# Patient Record
Sex: FEMALE | Race: WHITE | NOT HISPANIC OR LATINO | Employment: FULL TIME | URBAN - METROPOLITAN AREA
[De-identification: names, ages, dates, MRNs, and addresses within clinical notes are randomized per-mention and may not be internally consistent; named-entity substitution may affect disease eponyms.]

---

## 2017-04-03 ENCOUNTER — APPOINTMENT (OUTPATIENT)
Dept: WOMENS IMAGING | Facility: HOSPITAL | Age: 47
End: 2017-04-03

## 2017-04-03 PROCEDURE — 77067 SCR MAMMO BI INCL CAD: CPT | Performed by: RADIOLOGY

## 2017-09-05 ENCOUNTER — HOSPITAL ENCOUNTER (OUTPATIENT)
Dept: GENERAL RADIOLOGY | Facility: HOSPITAL | Age: 47
Discharge: HOME | End: 2017-09-05
Admitting: NURSE PRACTITIONER

## 2017-09-05 ENCOUNTER — OFFICE VISIT (OUTPATIENT)
Dept: INTERNAL MEDICINE | Age: 47
End: 2017-09-05

## 2017-09-05 VITALS
TEMPERATURE: 97.3 F | HEIGHT: 64 IN | DIASTOLIC BLOOD PRESSURE: 88 MMHG | HEART RATE: 77 BPM | BODY MASS INDEX: 18.06 KG/M2 | SYSTOLIC BLOOD PRESSURE: 128 MMHG | OXYGEN SATURATION: 98 % | WEIGHT: 105.8 LBS

## 2017-09-05 DIAGNOSIS — Z76.89 ENCOUNTER TO ESTABLISH CARE: ICD-10-CM

## 2017-09-05 DIAGNOSIS — M25.562 LEFT MEDIAL KNEE PAIN: Primary | ICD-10-CM

## 2017-09-05 DIAGNOSIS — Z00.00 ROUTINE ADULT HEALTH MAINTENANCE: ICD-10-CM

## 2017-09-05 DIAGNOSIS — J30.2 SEASONAL ALLERGIC RHINITIS, UNSPECIFIED ALLERGIC RHINITIS TRIGGER: ICD-10-CM

## 2017-09-05 PROCEDURE — 99213 OFFICE O/P EST LOW 20 MIN: CPT | Performed by: NURSE PRACTITIONER

## 2017-09-05 PROCEDURE — 73562 X-RAY EXAM OF KNEE 3: CPT

## 2017-09-05 RX ORDER — FLUTICASONE PROPIONATE 50 MCG
SPRAY, SUSPENSION (ML) NASAL
COMMUNITY
Start: 2017-08-24

## 2017-09-05 RX ORDER — MELOXICAM 7.5 MG/1
7.5 TABLET ORAL DAILY
Qty: 30 TABLET | Refills: 1 | Status: SHIPPED | OUTPATIENT
Start: 2017-09-05 | End: 2019-10-23

## 2017-09-05 RX ORDER — CHLORAL HYDRATE 500 MG
CAPSULE ORAL
COMMUNITY
End: 2021-11-04

## 2017-09-05 RX ORDER — MONTELUKAST SODIUM 10 MG/1
TABLET ORAL
COMMUNITY
Start: 2017-08-24 | End: 2018-08-20

## 2017-09-05 RX ORDER — MELATONIN
1000 DAILY
COMMUNITY

## 2017-09-05 RX ORDER — ASCORBIC ACID 500 MG
500 TABLET ORAL DAILY
COMMUNITY

## 2017-09-05 NOTE — PATIENT INSTRUCTIONS
Meniscus Tear  A meniscus tear is a knee injury in which a piece of the meniscus is torn. The meniscus is a thick, rubbery, wedge-shaped cartilage in the knee. Two menisci are located in each knee. They sit between the upper bone (femur) and lower bone (tibia) that make up the knee joint. Each meniscus acts as a shock absorber for the knee.  A torn meniscus is one of the most common types of knee injuries. This injury can range from mild to severe. Surgery may be needed for a severe tear.  CAUSES  This injury may be caused by any squatting, twisting, or pivoting movement. Sports-related injuries are the most common cause. These often occur from:  · Running and stopping suddenly.  · Changing direction.  · Being tackled or knocked off your feet.  As people get older, their meniscus gets thinner and weaker. In these people, tears can happen more easily, such as from climbing stairs.   RISK FACTORS  This injury is more likely to happen to:  · People who play contact sports.  · Males.  · People who are 30-40 years of age.  SYMPTOMS   Symptoms of this injury include:  · Knee pain, especially at the side of the knee joint. You may feel pain when the injury occurs, or you may only hear a pop and feel pain later.  · A feeling that your knee is clicking, catching, locking, or giving way.  · Not being able to fully bend or extend your knee.  · Bruising or swelling in your knee.  DIAGNOSIS   This injury may be diagnosed based on your symptoms and a physical exam. The physical exam may include:  · Moving your knee in different ways.  · Feeling for tenderness.  · Listening for a clicking sound.  · Checking if your knee locks or catches.  You may also have tests, such as:  · X-rays.  · MRI.  · A procedure to look inside your knee with a narrow surgical telescope (arthroscopy).  You may be referred to a knee specialist (orthopedic surgeon).  TREATMENT   Treatment for this injury depends on the severity of the tear. Treatment for a  mild tear may include:  · Rest.  · Medicine to reduce pain and swelling. This is usually a nonsteroidal anti-inflammatory drug (NSAID).  · A knee brace or an elastic sleeve or wrap.  · Using crutches or a walker to keep weight off your knee and to help you walk.  · Exercises to strengthen your knee (physical therapy).  You may need surgery if you have a severe tear or if other treatments are not working.   HOME CARE INSTRUCTIONS  Managing Pain and Swelling  · Take over-the-counter and prescription medicines only as told by your health care provider.  · If directed, apply ice to the injured area:  ¨ Put ice in a plastic bag.  ¨ Place a towel between your skin and the bag.  ¨ Leave the ice on for 20 minutes, 2-3 times per day.  · Raise (elevate) the injured area above the level of your heart while you are sitting or lying down.  Activity  · Do not use the injured limb to support your body weight until your health care provider says that you can. Use crutches or a walker as told by your health care provider.  · Return to your normal activities as told by your health care provider. Ask your health care provider what activities are safe for you.  · Perform range-of-motion exercises only as told by your health care provider.  · Begin doing exercises to strengthen your knee and leg muscles only as told by your health care provider. After you recover, your health care provider may recommend these exercises to help prevent another injury.  General Instructions  · Use a knee brace or elastic wrap as told by your health care provider.  · Keep all follow-up visits as told by your health care provider. This is important.  SEEK MEDICAL CARE IF:  · You have a fever.  · Your knee becomes red, tender, or swollen.  · Your pain medicine is not helping.  · Your symptoms get worse or do not improve after 2 weeks of home care.     This information is not intended to replace advice given to you by your health care provider. Make sure you  discuss any questions you have with your health care provider.     Document Released: 03/09/2004 Document Revised: 09/07/2016 Document Reviewed: 04/11/2016  Elsevier Interactive Patient Education ©2017 [employer]

## 2017-09-05 NOTE — PROGRESS NOTES
"Chante Olivas / 47 y.o. / female  09/05/2017    VITALS:    /88  Pulse 77  Temp 97.3 °F (36.3 °C)  Ht 63.5\" (161.3 cm)  Wt 105 lb 12.8 oz (48 kg)  SpO2 98%  BMI 18.45 kg/m2  BP Readings from Last 3 Encounters:   09/05/17 128/88     Wt Readings from Last 3 Encounters:   09/05/17 105 lb 12.8 oz (48 kg)      Body mass index is 18.45 kg/(m^2).    CC: Main reason(s) for today's visit: Establish Care (new pt to establish care)    Knee Pain: Patient presents with knee pain involving the  left knee. Onset of the symptoms was several weeks ago. Inciting event: none known. Current symptoms include pain located medial knee. Pain is aggravated by going up and down stairs and rising after sitting.  Patient has had no prior knee problems. Evaluation to date: none. Treatment to date: OTC analgesics which are somewhat effective. Also using ice, without improvement. No longer swelling.   Increased walking activity, wears heels frequently, no running.   Pain started while walking up steps, although patient cannot recall any specific injury or twisting of the knee.  No previous history of knee issues.  ____________________________________________________________________    ASSESSMENT & PLAN:    1. Left medial knee pain  Consider minor meniscal injury of left knee.  Discussed with patient we will obtain knee x-ray today to rule out any bony abnormality.  Discussed conservative treatment with use of daily by mouth NSAID, rest as needed, will follow-up with me in about 1 month for reevaluation.  If knee pain persists or worsens, may need to consider MRI and/or orthopedic referral.    - XR Knee 3 View Left  - meloxicam (MOBIC) 7.5 MG tablet; Take 1 tablet by mouth Daily.  Dispense: 30 tablet; Refill: 1    2. Encounter to establish care    - CBC & Differential  - Comprehensive Metabolic Panel  - Lipid Panel With / Chol / HDL Ratio  - TSH+Free T4  - Vitamin D 25 Hydroxy    3. Routine adult health maintenance    - CBC & " Differential  - Comprehensive Metabolic Panel  - Lipid Panel With / Chol / HDL Ratio  - TSH+Free T4  - Vitamin D 25 Hydroxy    4. Seasonal allergic rhinitis, unspecified allergic rhinitis trigger    - CBC & Differential  - Comprehensive Metabolic Panel  - Lipid Panel With / Chol / HDL Ratio  - TSH+Free T4  - Vitamin D 25 Hydroxy      Summary/Discussion:            Return in about 1 month (around 10/5/2017).    Future Appointments  Date Time Provider Department Center   9/6/2017 8:50 AM [facility] None   10/5/2017 8:40 AM [facility] Black[facility] None       ____________________________________________________________________    HPI:    Patient is a 47 y.o. female who is here to establish care.         Patient Care Team:  CHENG Blackman as PCP - General (Internal Medicine)  Jasmyne Johnston MD as Consulting Physician (Obstetrics and Gynecology)  Devaughn Obrien MD as Consulting Physician (Urology)    REVIEW OF SYSTEMS    Review of Systems   Constitutional: Negative for chills, fatigue and fever.   Respiratory: Negative for shortness of breath.    Cardiovascular: Negative for chest pain.   Musculoskeletal: Positive for arthralgias. Negative for myalgias.   Neurological: Negative for dizziness, numbness and headaches.   Psychiatric/Behavioral: Positive for sleep disturbance.     Other: As noted per HPI      PHYSICAL EXAMINATION    Physical Exam   Constitutional: She is oriented to person, place, and time. Vital signs are normal. She appears well-developed and well-nourished. She is cooperative. She does not appear ill. No distress.   Cardiovascular: Normal rate, regular rhythm, S1 normal, S2 normal and normal heart sounds.    No murmur heard.  Pulmonary/Chest: Effort normal and breath sounds normal. She has no decreased breath sounds. She has no wheezes. She has no rhonchi. She has no rales.   Musculoskeletal:        Right knee: Normal.        Left knee: She exhibits normal range of  motion, no swelling, no effusion, no ecchymosis, no deformity, no laceration, no erythema, normal alignment, no LCL laxity, normal patellar mobility, no bony tenderness, normal meniscus and no MCL laxity. Tenderness found. Medial joint line (mild) tenderness noted. No MCL, no LCL and no patellar tendon tenderness noted.   Neurological: She is alert and oriented to person, place, and time.   Skin: Skin is warm, dry and intact.   Psychiatric: She has a normal mood and affect. Her speech is normal and behavior is normal. Judgment and thought content normal. Cognition and memory are normal.   Nursing note and vitals reviewed.      REVIEWED DATA:    Labs:   No results found for: NA, K, AST, ALT, BUN, CREATININE, EGFRIFNONA, EGFRIFAFRI    No results found for: GLU, HGBA1C, MICROALBUR    No results found for: LDL, HDL, TRIG, CHOLHDLRATIO    No results found for: TSH, FREET4       Lab Results   Component Value Date    WBC 6.09 11/06/2015    HGB 11.5 (L) 11/06/2015     11/06/2015         Imaging:        Medical Tests:        Summary of old records / correspondence / consultant report:        Request outside records:        ______________________________________________________________________    No Known Allergies    Current Outpatient Prescriptions on File Prior to Visit   Medication Sig Dispense Refill   • cetirizine (zyrTEC) 10 MG tablet Take 10 mg by mouth.     • norethindrone-ethinyl estradiol (ORTHO-NOVUM 1-35 TAB,NORTREL 1-35 TAB) 1-35 MG-MCG per tablet Take 1 tablet by mouth.       No current facility-administered medications on file prior to visit.        PFSH:     The following portions of the patient's history were reviewed and updated as appropriate: Allergies / Current Medications / Past Medical History / Surgical History / Social History / Family History    There is no problem list on file for this patient.      Past Medical History:   Diagnosis Date   • Allergic    • Kidney stone        Past Surgical  History:   Procedure Laterality Date   • PARTIAL HYSTERECTOMY N/A 11/2015   • TONSILLECTOMY         Social History     Social History   • Marital status:      Spouse name: N/A   • Number of children: 0   • Years of education: N/A     Occupational History   •        Social History Main Topics   • Smoking status: Never Smoker   • Smokeless tobacco: Never Used   • Alcohol use Yes      Comment: 1 glasses every night   • Drug use: No   • Sexual activity: Yes     Partners: Male     Other Topics Concern   • None     Social History Narrative   • None       Family History   Problem Relation Age of Onset   • Lung cancer Maternal Grandmother    • Prostate cancer Maternal Grandfather    • Cancer Maternal Grandfather    • Hypertension Mother    • Hypertension Father    • Heart disease Father    • Cancer Paternal Grandmother          **Sunny Disclaimer:   Much of this encounter note is an electronic transcription/translation of spoken language to printed text. The electronic translation of spoken language may permit erroneous, or at times, nonsensical words or phrases to be inadvertently transcribed. Although I have reviewed the note for such errors, some may still exist.

## 2017-09-06 ENCOUNTER — TELEPHONE (OUTPATIENT)
Dept: INTERNAL MEDICINE | Age: 47
End: 2017-09-06

## 2017-09-06 NOTE — TELEPHONE ENCOUNTER
----- Message from CHENG Blackman sent at 9/6/2017  8:36 AM EDT -----  Please call patient with results and send result letter to home address.    Chante:     Your knee Xray shows some very mild arthritis of the knee, also with a small effusion (fluid on the knee). This is usually caused by some inflammation in the knee, either arthritis or from meniscal injury like we discussed. I would continue to treat conservatively as we discussed during your visit, and follow-up in about 1 month.    Lissett ANAND

## 2017-09-06 NOTE — TELEPHONE ENCOUNTER
Left VM for pt regarding X-ray results. Pt was informed of X-ray results, showing mild arthritis and effusion. Pt was advised to continue treatment as planned, and to f/u in 1 month as scheduled. Pt was advised to call office with any questions or concerns.    CATINA

## 2017-09-07 LAB
25(OH)D3+25(OH)D2 SERPL-MCNC: 36.4 NG/ML (ref 30–100)
ALBUMIN SERPL-MCNC: 4.4 G/DL (ref 3.5–5.2)
ALBUMIN/GLOB SERPL: 1.7 G/DL
ALP SERPL-CCNC: 44 U/L (ref 39–117)
ALT SERPL-CCNC: 13 U/L (ref 1–33)
AST SERPL-CCNC: 17 U/L (ref 1–32)
BASOPHILS # BLD AUTO: 0.04 10*3/MM3 (ref 0–0.2)
BASOPHILS NFR BLD AUTO: 0.8 % (ref 0–1.5)
BILIRUB SERPL-MCNC: 0.6 MG/DL (ref 0.1–1.2)
BUN SERPL-MCNC: 15 MG/DL (ref 6–20)
BUN/CREAT SERPL: 20.5 (ref 7–25)
CALCIUM SERPL-MCNC: 9.5 MG/DL (ref 8.6–10.5)
CHLORIDE SERPL-SCNC: 102 MMOL/L (ref 98–107)
CHOLEST SERPL-MCNC: 207 MG/DL (ref 0–200)
CHOLEST/HDLC SERPL: 2.33 {RATIO}
CO2 SERPL-SCNC: 29.3 MMOL/L (ref 22–29)
CREAT SERPL-MCNC: 0.73 MG/DL (ref 0.57–1)
EOSINOPHIL # BLD AUTO: 0.04 10*3/MM3 (ref 0–0.7)
EOSINOPHIL NFR BLD AUTO: 0.8 % (ref 0.3–6.2)
ERYTHROCYTE [DISTWIDTH] IN BLOOD BY AUTOMATED COUNT: 13.3 % (ref 11.7–13)
GLOBULIN SER CALC-MCNC: 2.6 GM/DL
GLUCOSE SERPL-MCNC: 95 MG/DL (ref 65–99)
HCT VFR BLD AUTO: 40.4 % (ref 35.6–45.5)
HDLC SERPL-MCNC: 89 MG/DL (ref 40–60)
HGB BLD-MCNC: 13 G/DL (ref 11.9–15.5)
IMM GRANULOCYTES # BLD: 0 10*3/MM3 (ref 0–0.03)
IMM GRANULOCYTES NFR BLD: 0 % (ref 0–0.5)
LDLC SERPL CALC-MCNC: 104 MG/DL (ref 0–100)
LYMPHOCYTES # BLD AUTO: 2.24 10*3/MM3 (ref 0.9–4.8)
LYMPHOCYTES NFR BLD AUTO: 42.6 % (ref 19.6–45.3)
MCH RBC QN AUTO: 31.5 PG (ref 26.9–32)
MCHC RBC AUTO-ENTMCNC: 32.2 G/DL (ref 32.4–36.3)
MCV RBC AUTO: 97.8 FL (ref 80.5–98.2)
MONOCYTES # BLD AUTO: 0.61 10*3/MM3 (ref 0.2–1.2)
MONOCYTES NFR BLD AUTO: 11.6 % (ref 5–12)
NEUTROPHILS # BLD AUTO: 2.33 10*3/MM3 (ref 1.9–8.1)
NEUTROPHILS NFR BLD AUTO: 44.2 % (ref 42.7–76)
PLATELET # BLD AUTO: 253 10*3/MM3 (ref 140–500)
POTASSIUM SERPL-SCNC: 4.4 MMOL/L (ref 3.5–5.2)
PROT SERPL-MCNC: 7 G/DL (ref 6–8.5)
RBC # BLD AUTO: 4.13 10*6/MM3 (ref 3.9–5.2)
SODIUM SERPL-SCNC: 142 MMOL/L (ref 136–145)
T4 FREE SERPL-MCNC: 1.15 NG/DL (ref 0.93–1.7)
TRIGL SERPL-MCNC: 69 MG/DL (ref 0–150)
TSH SERPL DL<=0.005 MIU/L-ACNC: 2.04 MIU/ML (ref 0.27–4.2)
VLDLC SERPL CALC-MCNC: 13.8 MG/DL (ref 5–40)
WBC # BLD AUTO: 5.26 10*3/MM3 (ref 4.5–10.7)

## 2017-09-08 ENCOUNTER — TELEPHONE (OUTPATIENT)
Dept: INTERNAL MEDICINE | Age: 47
End: 2017-09-08

## 2017-09-08 NOTE — TELEPHONE ENCOUNTER
Left VM for pt regarding lab results. Pt was informed of all normal lab results. Pt was instructed to continue current medications and f/u at next scheduled appt.    KD

## 2017-09-08 NOTE — TELEPHONE ENCOUNTER
"----- Message from CHENG Blackman sent at 9/7/2017 11:30 AM EDT -----  Please call patient with results and send result letter to home address.    Chante:     Here are the results of your labs from your visit.  Your CBC looks normal, no sign of anemia, platelet disorder, or obvious infection.   Your CMP labs are all normal. This is a measure of kidney function, electrolytes, and liver function.     Your cholesterol levels show that your total cholesterol is a little bit elevated at 207, but your HDL, which is considered good cholesterol, is at a very high level of 89, which is good.  Your LDL (\" bad cholesterol\") is just barely elevated at 104.  Overall, your lipid levels look good and she had a high level of good cholesterol saw not worried about your total cholesterol being a little bit elevated.    Your tests for thyroid function are normal.   Vitamin D levels are good. Continue current dose of replacement.     Please feel free to call the office or contact me through [facility] with any questions or concerns.     Lissett ANAND         "

## 2017-10-05 ENCOUNTER — OFFICE VISIT (OUTPATIENT)
Dept: INTERNAL MEDICINE | Age: 47
End: 2017-10-05

## 2017-10-05 VITALS
BODY MASS INDEX: 17.79 KG/M2 | SYSTOLIC BLOOD PRESSURE: 118 MMHG | HEART RATE: 90 BPM | DIASTOLIC BLOOD PRESSURE: 74 MMHG | WEIGHT: 104.2 LBS | OXYGEN SATURATION: 99 % | HEIGHT: 64 IN | TEMPERATURE: 98.2 F

## 2017-10-05 DIAGNOSIS — J32.9 RECURRENT SINUSITIS: ICD-10-CM

## 2017-10-05 DIAGNOSIS — M25.562 LEFT MEDIAL KNEE PAIN: Primary | ICD-10-CM

## 2017-10-05 PROCEDURE — 99213 OFFICE O/P EST LOW 20 MIN: CPT | Performed by: NURSE PRACTITIONER

## 2017-10-05 RX ORDER — AZITHROMYCIN 500 MG/1
TABLET, FILM COATED ORAL
COMMUNITY
Start: 2017-09-22 | End: 2018-10-22

## 2017-10-05 RX ORDER — PREDNISONE 20 MG/1
TABLET ORAL
COMMUNITY
Start: 2017-09-22 | End: 2018-08-20

## 2017-10-05 NOTE — PROGRESS NOTES
Subjective   Chante Olivas is a 47 y.o. female.     Knee Pain    Incident onset: 3 months ago. There was no injury mechanism. The pain is present in the left knee. The quality of the pain is described as aching. The pain has been improving since onset. Pertinent negatives include no inability to bear weight, loss of motion, loss of sensation, muscle weakness, numbness or tingling. She reports no foreign bodies present. She has tried NSAIDs (Treated with meloxicam for 1 month ) for the symptoms. The treatment provided significant relief.    Has not been taking Meloxicam for 1 week. Recently returned from [place] for a visit, reports had worsening pain while there, but had increased activity.     Had a sinus infection recently treated with Azithromycin and Prednisone x 10 days by her allergist Dr. Mora. Reports symptoms are improved, but she still has some pressure on her ears.  Currently taking Flonase, Singulair, and Zyrtec for allergies.    The following portions of the patient's history were reviewed and updated as appropriate: allergies, current medications, past family history, past medical history, past social history, past surgical history and problem list.    Review of Systems   Constitutional: Negative for chills, fatigue and fever.   HENT: Negative for congestion, ear pain, rhinorrhea, sinus pain and sore throat.    Musculoskeletal: Negative for arthralgias and joint swelling.   Neurological: Negative for tingling and numbness.       Objective   Physical Exam   Constitutional: She is oriented to person, place, and time. Vital signs are normal. She appears well-developed and well-nourished. She is cooperative. She does not appear ill. No distress.   HENT:   Head: Normocephalic and atraumatic.   Right Ear: Hearing, tympanic membrane, external ear and ear canal normal. No drainage or swelling. Tympanic membrane is not injected and not erythematous. No middle ear effusion.   Left Ear: Hearing, tympanic  membrane, external ear and ear canal normal. No drainage or swelling. Tympanic membrane is not injected and not erythematous.  No middle ear effusion.   Nose: Nose normal.   Mouth/Throat: Uvula is midline, oropharynx is clear and moist and mucous membranes are normal. Tonsils are 2+ on the right. Tonsils are 2+ on the left. No tonsillar exudate.   Cardiovascular: Normal rate, regular rhythm and normal heart sounds.    No murmur heard.  Pulmonary/Chest: Effort normal and breath sounds normal. She has no decreased breath sounds. She has no wheezes. She has no rhonchi. She has no rales.   Musculoskeletal:        Left knee: Normal. No medial joint line and no MCL tenderness noted.   Lymphadenopathy:     She has no cervical adenopathy.        Right cervical: No superficial cervical, no deep cervical and no posterior cervical adenopathy present.       Left cervical: No superficial cervical, no deep cervical and no posterior cervical adenopathy present.   Neurological: She is alert and oriented to person, place, and time. She has normal strength.   Skin: Skin is warm, dry and intact.   Psychiatric: She has a normal mood and affect. Her speech is normal and behavior is normal. Judgment and thought content normal. Cognition and memory are normal.   Nursing note and vitals reviewed.      Assessment/Plan   Problems Addressed this Visit     None      Visit Diagnoses     Left medial knee pain    -  Primary    Recurrent sinusitis            1. Left medial knee pain  Patient has had complaints of left medial knee pain problems approximately 3 months.  At last visit, I treated her with meloxicam daily for 1 month, she has not refilled this medication.  She reports significant improvement in knee pain.  Still has episodic episodes of mild pain, but has significantly improved.  Instructed patient to take meloxicam or ibuprofen as needed for pain.  Follow-up with me if worsening.    2. Recurrent sinusitis  Recently treated with  azithromycin and prednisone per her allergist.  She reports sinusitis symptoms have improved, although she does have some residual pressure on her ears.  Ear exam shows that she does have small effusion, but no infection noted.  Discussed with patient that it may take a few weeks to completely clear.

## 2018-08-20 ENCOUNTER — OFFICE VISIT (OUTPATIENT)
Dept: INTERNAL MEDICINE | Age: 48
End: 2018-08-20

## 2018-08-20 VITALS
TEMPERATURE: 98.3 F | BODY MASS INDEX: 18.34 KG/M2 | SYSTOLIC BLOOD PRESSURE: 112 MMHG | WEIGHT: 107.4 LBS | DIASTOLIC BLOOD PRESSURE: 78 MMHG | OXYGEN SATURATION: 98 % | HEART RATE: 96 BPM | HEIGHT: 64 IN

## 2018-08-20 DIAGNOSIS — R31.9 URINARY TRACT INFECTION WITH HEMATURIA, SITE UNSPECIFIED: Primary | ICD-10-CM

## 2018-08-20 DIAGNOSIS — N39.0 URINARY TRACT INFECTION WITH HEMATURIA, SITE UNSPECIFIED: Primary | ICD-10-CM

## 2018-08-20 LAB
BILIRUB BLD-MCNC: NEGATIVE MG/DL
CLARITY, POC: CLEAR
COLOR UR: ABNORMAL
GLUCOSE UR STRIP-MCNC: NEGATIVE MG/DL
KETONES UR QL: NEGATIVE
LEUKOCYTE EST, POC: ABNORMAL
NITRITE UR-MCNC: POSITIVE MG/ML
PH UR: 6 [PH] (ref 5–8)
PROT UR STRIP-MCNC: ABNORMAL MG/DL
RBC # UR STRIP: ABNORMAL /UL
SP GR UR: 1.01 (ref 1–1.03)
UROBILINOGEN UR QL: NORMAL

## 2018-08-20 PROCEDURE — 81003 URINALYSIS AUTO W/O SCOPE: CPT | Performed by: NURSE PRACTITIONER

## 2018-08-20 PROCEDURE — 99213 OFFICE O/P EST LOW 20 MIN: CPT | Performed by: NURSE PRACTITIONER

## 2018-08-20 RX ORDER — FLUCONAZOLE 150 MG/1
150 TABLET ORAL ONCE
Qty: 1 TABLET | Refills: 0 | Status: SHIPPED | OUTPATIENT
Start: 2018-08-20 | End: 2018-08-20

## 2018-08-20 RX ORDER — NITROFURANTOIN 25; 75 MG/1; MG/1
100 CAPSULE ORAL EVERY 12 HOURS SCHEDULED
Qty: 14 CAPSULE | Refills: 0 | Status: SHIPPED | OUTPATIENT
Start: 2018-08-20 | End: 2018-08-27

## 2018-08-20 NOTE — PROGRESS NOTES
Subjective   Chante Olivas is a 48 y.o. female.     Urinary Tract Infection    This is a new problem. Episode onset: 3 days ago. The problem has been waxing and waning. The pain is mild. There has been no fever. Associated symptoms include frequency. Pertinent negatives include no chills, flank pain, hematuria, nausea, urgency or vomiting. Associated symptoms comments: LBP aching, bladder spasms. She has tried increased fluids (AZO) for the symptoms. There is no history of kidney stones, recurrent UTIs, a single kidney or a urological procedure.   Patient reports recent new sexual partner, did not urinate after intercourse. History of UTIs in the past, but has been many years.  No concern for STD, denies vaginal discharge/odor/pain.     The following portions of the patient's history were reviewed and updated as appropriate: allergies, current medications, past family history, past medical history, past social history, past surgical history and problem list.    Review of Systems   Constitutional: Negative for chills.   Gastrointestinal: Negative for nausea and vomiting.   Genitourinary: Positive for dysuria and frequency. Negative for flank pain, hematuria, pelvic pain, urgency, vaginal discharge and vaginal pain.       Objective   Physical Exam   Constitutional: She appears well-developed and well-nourished. She is active. She does not appear ill. No distress.   Cardiovascular: Normal rate, regular rhythm and normal heart sounds.    Pulmonary/Chest: Effort normal and breath sounds normal. She has no decreased breath sounds. She has no wheezes. She has no rhonchi. She has no rales.   Neurological: She is alert.   Nursing note and vitals reviewed.        Assessment/Plan   Problems Addressed this Visit     None      Visit Diagnoses     Urinary tract infection with hematuria, site unspecified    -  Primary    Relevant Medications    nitrofurantoin, macrocrystal-monohydrate, (MACROBID) 100 MG capsule    fluconazole  (DIFLUCAN) 150 MG tablet    Other Relevant Orders    POCT urinalysis dipstick, automated (Completed)    Urine Culture - Urine, Urine, Clean Catch        1. Urinary tract infection with hematuria, site unspecified  Will start patient on PO antibiotics for treatment of lower UTI. Instructed patient to increase PO Fluid intake, may use OTC pyridium for up to 3 days for symptom relief. Discussed with patient to monitor for worsening or new symptoms, such as fever, severe flank pain, vomiting and to notify office immediately should these symptoms arise.     - POCT urinalysis dipstick, automated  - nitrofurantoin, macrocrystal-monohydrate, (MACROBID) 100 MG capsule; Take 1 capsule by mouth Every 12 (Twelve) Hours for 7 days.  Dispense: 14 capsule; Refill: 0  - fluconazole (DIFLUCAN) 150 MG tablet; Take 1 tablet by mouth 1 (One) Time for 1 dose.  Dispense: 1 tablet; Refill: 0  - Urine Culture - Urine, Urine, Clean Catch

## 2018-08-23 LAB
BACTERIA UR CULT: ABNORMAL
BACTERIA UR CULT: ABNORMAL
OTHER ANTIBIOTIC SUSC ISLT: ABNORMAL

## 2018-10-22 ENCOUNTER — OFFICE VISIT (OUTPATIENT)
Dept: INTERNAL MEDICINE | Age: 48
End: 2018-10-22

## 2018-10-22 VITALS
HEART RATE: 80 BPM | SYSTOLIC BLOOD PRESSURE: 146 MMHG | BODY MASS INDEX: 18.57 KG/M2 | TEMPERATURE: 96.8 F | OXYGEN SATURATION: 99 % | HEIGHT: 64 IN | WEIGHT: 108.8 LBS | DIASTOLIC BLOOD PRESSURE: 86 MMHG

## 2018-10-22 DIAGNOSIS — Z00.00 ANNUAL PHYSICAL EXAM: ICD-10-CM

## 2018-10-22 DIAGNOSIS — Z12.11 SCREENING FOR COLON CANCER: Primary | ICD-10-CM

## 2018-10-22 DIAGNOSIS — Z00.00 ROUTINE ADULT HEALTH MAINTENANCE: ICD-10-CM

## 2018-10-22 DIAGNOSIS — Z00.00 PREVENTATIVE HEALTH CARE: ICD-10-CM

## 2018-10-22 LAB
ALBUMIN SERPL-MCNC: 5 G/DL (ref 3.5–5.2)
ALBUMIN/GLOB SERPL: 1.9 G/DL
ALP SERPL-CCNC: 54 U/L (ref 39–117)
ALT SERPL-CCNC: 13 U/L (ref 1–33)
AST SERPL-CCNC: 15 U/L (ref 1–32)
BASOPHILS # BLD AUTO: 0.04 10*3/MM3 (ref 0–0.2)
BASOPHILS NFR BLD AUTO: 0.7 % (ref 0–1.5)
BILIRUB SERPL-MCNC: 0.4 MG/DL (ref 0.1–1.2)
BUN SERPL-MCNC: 12 MG/DL (ref 6–20)
BUN/CREAT SERPL: 18.8 (ref 7–25)
CALCIUM SERPL-MCNC: 10.1 MG/DL (ref 8.6–10.5)
CHLORIDE SERPL-SCNC: 99 MMOL/L (ref 98–107)
CHOLEST SERPL-MCNC: 213 MG/DL (ref 0–200)
CHOLEST/HDLC SERPL: 2.32 {RATIO}
CO2 SERPL-SCNC: 28.8 MMOL/L (ref 22–29)
CREAT SERPL-MCNC: 0.64 MG/DL (ref 0.57–1)
EOSINOPHIL # BLD AUTO: 0.04 10*3/MM3 (ref 0–0.7)
EOSINOPHIL NFR BLD AUTO: 0.7 % (ref 0.3–6.2)
ERYTHROCYTE [DISTWIDTH] IN BLOOD BY AUTOMATED COUNT: 13.1 % (ref 11.7–13)
GLOBULIN SER CALC-MCNC: 2.7 GM/DL
GLUCOSE SERPL-MCNC: 99 MG/DL (ref 65–99)
HCT VFR BLD AUTO: 43.3 % (ref 35.6–45.5)
HDLC SERPL-MCNC: 92 MG/DL (ref 40–60)
HGB BLD-MCNC: 13.4 G/DL (ref 11.9–15.5)
IMM GRANULOCYTES # BLD: 0 10*3/MM3 (ref 0–0.03)
IMM GRANULOCYTES NFR BLD: 0 % (ref 0–0.5)
LDLC SERPL CALC-MCNC: 112 MG/DL (ref 0–100)
LYMPHOCYTES # BLD AUTO: 1.77 10*3/MM3 (ref 0.9–4.8)
LYMPHOCYTES NFR BLD AUTO: 31.3 % (ref 19.6–45.3)
MCH RBC QN AUTO: 30.2 PG (ref 26.9–32)
MCHC RBC AUTO-ENTMCNC: 30.9 G/DL (ref 32.4–36.3)
MCV RBC AUTO: 97.5 FL (ref 80.5–98.2)
MONOCYTES # BLD AUTO: 0.65 10*3/MM3 (ref 0.2–1.2)
MONOCYTES NFR BLD AUTO: 11.5 % (ref 5–12)
NEUTROPHILS # BLD AUTO: 3.16 10*3/MM3 (ref 1.9–8.1)
NEUTROPHILS NFR BLD AUTO: 55.8 % (ref 42.7–76)
PLATELET # BLD AUTO: 320 10*3/MM3 (ref 140–500)
POTASSIUM SERPL-SCNC: 4.6 MMOL/L (ref 3.5–5.2)
PROT SERPL-MCNC: 7.7 G/DL (ref 6–8.5)
RBC # BLD AUTO: 4.44 10*6/MM3 (ref 3.9–5.2)
SODIUM SERPL-SCNC: 140 MMOL/L (ref 136–145)
TRIGL SERPL-MCNC: 47 MG/DL (ref 0–150)
TSH SERPL DL<=0.005 MIU/L-ACNC: 1.65 MIU/ML (ref 0.27–4.2)
VLDLC SERPL CALC-MCNC: 9.4 MG/DL (ref 5–40)
WBC # BLD AUTO: 5.66 10*3/MM3 (ref 4.5–10.7)

## 2018-10-22 PROCEDURE — 99396 PREV VISIT EST AGE 40-64: CPT | Performed by: NURSE PRACTITIONER

## 2018-10-22 NOTE — PATIENT INSTRUCTIONS
Health Maintenance, Female    Adopting a healthy lifestyle and getting preventive care can go a long way to promote health and wellness. Talk with your health care provider about what schedule of regular examinations is right for you. This is a good chance for you to check in with your provider about disease prevention and staying healthy.  In between checkups, there are plenty of things you can do on your own. Experts have done a lot of research about which lifestyle changes and preventive measures are most likely to keep you healthy. Ask your health care provider for more information.  Weight and diet  Eat a healthy diet  · Be sure to include plenty of vegetables, fruits, low-fat dairy products, and lean protein.  · Do not eat a lot of foods high in solid fats, added sugars, or salt.  · Get regular exercise. This is one of the most important things you can do for your health.  ? Most adults should exercise for at least 150 minutes each week. The exercise should increase your heart rate and make you sweat (moderate-intensity exercise).  ? Most adults should also do strengthening exercises at least twice a week. This is in addition to the moderate-intensity exercise.    Maintain a healthy weight  · Body mass index (BMI) is a measurement that can be used to identify possible weight problems. It estimates body fat based on height and weight. Your health care provider can help determine your BMI and help you achieve or maintain a healthy weight.  · For females 20 years of age and older:  ? A BMI below 18.5 is considered underweight.  ? A BMI of 18.5 to 24.9 is normal.  ? A BMI of 25 to 29.9 is considered overweight.  ? A BMI of 30 and above is considered obese.    Watch levels of cholesterol and blood lipids  · You should start having your blood tested for lipids and cholesterol at 20 years of age, then have this test every 5 years.  · You may need to have your cholesterol levels checked more often if:  ? Your lipid or  cholesterol levels are high.  ? You are older than 50 years of age.  ? You are at high risk for heart disease.    Cancer screening  Lung Cancer  · Lung cancer screening is recommended for adults 55-80 years old who are at high risk for lung cancer because of a history of smoking.  · A yearly low-dose CT scan of the lungs is recommended for people who:  ? Currently smoke.  ? Have quit within the past 15 years.  ? Have at least a 30-pack-year history of smoking. A pack year is smoking an average of one pack of cigarettes a day for 1 year.  · Yearly screening should continue until it has been 15 years since you quit.  · Yearly screening should stop if you develop a health problem that would prevent you from having lung cancer treatment.    Breast Cancer  · Practice breast self-awareness. This means understanding how your breasts normally appear and feel.  · It also means doing regular breast self-exams. Let your health care provider know about any changes, no matter how small.  · If you are in your 20s or 30s, you should have a clinical breast exam (CBE) by a health care provider every 1-3 years as part of a regular health exam.  · If you are 40 or older, have a CBE every year. Also consider having a breast X-ray (mammogram) every year.  · If you have a family history of breast cancer, talk to your health care provider about genetic screening.  · If you are at high risk for breast cancer, talk to your health care provider about having an MRI and a mammogram every year.  · Breast cancer gene (BRCA) assessment is recommended for women who have family members with BRCA-related cancers. BRCA-related cancers include:  ? Breast.  ? Ovarian.  ? Tubal.  ? Peritoneal cancers.  · Results of the assessment will determine the need for genetic counseling and BRCA1 and BRCA2 testing.    Cervical Cancer  Your health care provider may recommend that you be screened regularly for cancer of the pelvic organs (ovaries, uterus, and  vagina). This screening involves a pelvic examination, including checking for microscopic changes to the surface of your cervix (Pap test). You may be encouraged to have this screening done every 3 years, beginning at age 21.  · For women ages 30-65, health care providers may recommend pelvic exams and Pap testing every 3 years, or they may recommend the Pap and pelvic exam, combined with testing for human papilloma virus (HPV), every 5 years. Some types of HPV increase your risk of cervical cancer. Testing for HPV may also be done on women of any age with unclear Pap test results.  · Other health care providers may not recommend any screening for nonpregnant women who are considered low risk for pelvic cancer and who do not have symptoms. Ask your health care provider if a screening pelvic exam is right for you.  · If you have had past treatment for cervical cancer or a condition that could lead to cancer, you need Pap tests and screening for cancer for at least 20 years after your treatment. If Pap tests have been discontinued, your risk factors (such as having a new sexual partner) need to be reassessed to determine if screening should resume. Some women have medical problems that increase the chance of getting cervical cancer. In these cases, your health care provider may recommend more frequent screening and Pap tests.    Colorectal Cancer  · This type of cancer can be detected and often prevented.  · Routine colorectal cancer screening usually begins at 50 years of age and continues through 75 years of age.  · Your health care provider may recommend screening at an earlier age if you have risk factors for colon cancer.  · Your health care provider may also recommend using home test kits to check for hidden blood in the stool.  · A small camera at the end of a tube can be used to examine your colon directly (sigmoidoscopy or colonoscopy). This is done to check for the earliest forms of colorectal  cancer.  · Routine screening usually begins at age 50.  · Direct examination of the colon should be repeated every 5-10 years through 75 years of age. However, you may need to be screened more often if early forms of precancerous polyps or small growths are found.    Skin Cancer  · Check your skin from head to toe regularly.  · Tell your health care provider about any new moles or changes in moles, especially if there is a change in a mole's shape or color.  · Also tell your health care provider if you have a mole that is larger than the size of a pencil eraser.  · Always use sunscreen. Apply sunscreen liberally and repeatedly throughout the day.  · Protect yourself by wearing long sleeves, pants, a wide-brimmed hat, and sunglasses whenever you are outside.    Heart disease, diabetes, and high blood pressure  · High blood pressure causes heart disease and increases the risk of stroke. High blood pressure is more likely to develop in:  ? People who have blood pressure in the high end of the normal range (130-139/85-89 mm Hg).  ? People who are overweight or obese.  ? People who are .  · If you are 18-39 years of age, have your blood pressure checked every 3-5 years. If you are 40 years of age or older, have your blood pressure checked every year. You should have your blood pressure measured twice--once when you are at a hospital or clinic, and once when you are not at a hospital or clinic. Record the average of the two measurements. To check your blood pressure when you are not at a hospital or clinic, you can use:  ? An automated blood pressure machine at a pharmacy.  ? A home blood pressure monitor.  · If you are between 55 years and 79 years old, ask your health care provider if you should take aspirin to prevent strokes.  · Have regular diabetes screenings. This involves taking a blood sample to check your fasting blood sugar level.  ? If you are at a normal weight and have a low risk for  diabetes, have this test once every three years after 45 years of age.  ? If you are overweight and have a high risk for diabetes, consider being tested at a younger age or more often.  Preventing infection  Hepatitis B  · If you have a higher risk for hepatitis B, you should be screened for this virus. You are considered at high risk for hepatitis B if:  ? You were born in a country where hepatitis B is common. Ask your health care provider which countries are considered high risk.  ? Your parents were born in a high-risk country, and you have not been immunized against hepatitis B (hepatitis B vaccine).  ? You have HIV or AIDS.  ? You use needles to inject street drugs.  ? You live with someone who has hepatitis B.  ? You have had sex with someone who has hepatitis B.  ? You get hemodialysis treatment.  ? You take certain medicines for conditions, including cancer, organ transplantation, and autoimmune conditions.    Hepatitis C  · Blood testing is recommended for:  ? Everyone born from 1945 through 1965.  ? Anyone with known risk factors for hepatitis C.    Sexually transmitted infections (STIs)  · You should be screened for sexually transmitted infections (STIs) including gonorrhea and chlamydia if:  ? You are sexually active and are younger than 24 years of age.  ? You are older than 24 years of age and your health care provider tells you that you are at risk for this type of infection.  ? Your sexual activity has changed since you were last screened and you are at an increased risk for chlamydia or gonorrhea. Ask your health care provider if you are at risk.  · If you do not have HIV, but are at risk, it may be recommended that you take a prescription medicine daily to prevent HIV infection. This is called pre-exposure prophylaxis (PrEP). You are considered at risk if:  ? You are sexually active and do not regularly use condoms or know the HIV status of your partner(s).  ? You take drugs by injection.  ? You  are sexually active with a partner who has HIV.    Talk with your health care provider about whether you are at high risk of being infected with HIV. If you choose to begin PrEP, you should first be tested for HIV. You should then be tested every 3 months for as long as you are taking PrEP.  Pregnancy  · If you are premenopausal and you may become pregnant, ask your health care provider about preconception counseling.  · If you may become pregnant, take 400 to 800 micrograms (mcg) of folic acid every day.  · If you want to prevent pregnancy, talk to your health care provider about birth control (contraception).  Osteoporosis and menopause  · Osteoporosis is a disease in which the bones lose minerals and strength with aging. This can result in serious bone fractures. Your risk for osteoporosis can be identified using a bone density scan.  · If you are 65 years of age or older, or if you are at risk for osteoporosis and fractures, ask your health care provider if you should be screened.  · Ask your health care provider whether you should take a calcium or vitamin D supplement to lower your risk for osteoporosis.  · Menopause may have certain physical symptoms and risks.  · Hormone replacement therapy may reduce some of these symptoms and risks.  Talk to your health care provider about whether hormone replacement therapy is right for you.  Follow these instructions at home:  · Schedule regular health, dental, and eye exams.  · Stay current with your immunizations.  · Do not use any tobacco products including cigarettes, chewing tobacco, or electronic cigarettes.  · If you are pregnant, do not drink alcohol.  · If you are breastfeeding, limit how much and how often you drink alcohol.  · Limit alcohol intake to no more than 1 drink per day for nonpregnant women. One drink equals 12 ounces of beer, 5 ounces of wine, or 1½ ounces of hard liquor.  · Do not use street drugs.  · Do not share needles.  · Ask your health care  provider for help if you need support or information about quitting drugs.  · Tell your health care provider if you often feel depressed.  · Tell your health care provider if you have ever been abused or do not feel safe at home.  This information is not intended to replace advice given to you by your health care provider. Make sure you discuss any questions you have with your health care provider.  Document Released: 07/02/2012 Document Revised: 05/25/2017 Document Reviewed: 09/20/2016  [employer] Interactive Patient Education © 2018 [employer]

## 2018-10-22 NOTE — PROGRESS NOTES
[facility] [place] [facility]      Chante Brendon / 48 y.o. / female  10/22/2018    CC:  Annual Exam      HPI:      Chante presents for annual health maintenance visit.    · Last health maintenance visit: 1 year ago  · General health: good  · Lifestyle:  · Attempting to lose weight?: No   · Diet: good  · Exercise: good  · Tobacco: Never used   · Alcohol: regular (moderate)  · Work: Full-time    · Reproductive health:  · Sexually active?: Yes   · Sexual problems?: No problems  · Concern for STD?: No    · Sees Gynecologist?: Yes   · Corry/Postmenopausal?: Yes   · Domestic abuse concerns: No   · Depression Screening:      PHQ-2/PHQ-9 Depression Screening 10/22/2018   Little interest or pleasure in doing things 0   Feeling down, depressed, or hopeless 0   Total Score 0         PHQ-2: 0 (Not depressed)   PHQ-9: 0 (Negative screening for depression)    Patient Care Team:  Lissett Gavin APRN as PCP - General (Internal Medicine)  Jasmyne Johnston MD as Consulting Physician (Obstetrics and Gynecology)  Devaughn Obrien MD as Consulting Physician (Urology)  Michael Mora MD as Consulting Physician (Allergy)  ______________________________________________________________________    ALLERGIES  No Known Allergies     MEDICATIONS  Current Outpatient Prescriptions   Medication Sig Dispense Refill   • cetirizine (zyrTEC) 10 MG tablet Take 10 mg by mouth As Needed.     • Chlorcyclizine-Pseudoephed (STAHIST AD PO) Take  by mouth Daily.     • cholecalciferol (VITAMIN D3) 1000 units tablet Take 1,000 Units by mouth Daily.     • fluticasone (FLONASE) 50 MCG/ACT nasal spray      • FOLIC ACID PO Take  by mouth.     • Omega-3 Fatty Acids (FISH OIL) 1000 MG capsule capsule Take  by mouth Daily With Breakfast.     • vitamin C (ASCORBIC ACID) 500 MG tablet Take 500 mg by mouth Daily.     • meloxicam (MOBIC) 7.5 MG tablet Take 1 tablet by mouth Daily. 30 tablet 1     No current facility-administered medications for this visit.        PFSH:      The following portions of the patient's history were reviewed and updated as appropriate: Allergies / Current Medications / Past Medical History / Surgical History / Social History / Family History    PROBLEM LIST   There is no problem list on file for this patient.      PAST MEDICAL HISTORY  Past Medical History:   Diagnosis Date   • Allergic    • Kidney stone        SURGICAL HISTORY  Past Surgical History:   Procedure Laterality Date   • PARTIAL HYSTERECTOMY N/A 11/2015   • TONSILLECTOMY         SOCIAL HISTORY  Social History     Social History   • Marital status:    • Number of children: 0     Occupational History   •        Social History Main Topics   • Smoking status: Never Smoker   • Smokeless tobacco: Never Used   • Alcohol use Yes      Comment: 1 glasses every night; caffeine use   • Drug use: No   • Sexual activity: Yes     Partners: Male     Other Topics Concern   • Not on file       FAMILY HISTORY  Family History   Problem Relation Age of Onset   • Lung cancer Maternal Grandmother    • Prostate cancer Maternal Grandfather    • Cancer Maternal Grandfather    • Hypertension Mother    • Hypertension Father    • Heart disease Father    • Cancer Paternal Grandmother        IMMUNIZATION HISTORY  Immunization History   Administered Date(s) Administered   • Flu Mist 10/28/2016, 10/30/2017   • Influenza, Unspecified 09/28/2018       ______________________________________________________________________    REVIEW OF SYSTEMS    Review of Systems   Constitutional: Negative for activity change, appetite change, fatigue, fever and unexpected weight change.   HENT: Negative for congestion, ear pain, sore throat and trouble swallowing.    Eyes: Negative for visual disturbance.   Respiratory: Negative for shortness of breath.    Cardiovascular: Negative for chest pain and leg swelling.   Gastrointestinal: Negative for abdominal pain, blood in stool, constipation, diarrhea, nausea and  "vomiting.   Endocrine: Negative for polydipsia, polyphagia and polyuria.   Genitourinary: Positive for pelvic pain (intermittent left ovarian pain ). Negative for dysuria, vaginal bleeding and vaginal discharge.   Musculoskeletal: Negative for arthralgias, back pain and gait problem.   Neurological: Negative for dizziness, weakness, numbness and headaches.   Hematological: Negative for adenopathy.   Psychiatric/Behavioral: Negative for confusion. The patient is not nervous/anxious.          VITALS:    Visit Vitals  /86   Pulse 80   Temp 96.8 °F (36 °C)   Ht 161.3 cm (63.5\")   Wt 49.4 kg (108 lb 12.8 oz)   SpO2 99%   BMI 18.97 kg/m²       BP Readings from Last 3 Encounters:   10/22/18 146/86   08/20/18 112/78   10/05/17 118/74     Wt Readings from Last 3 Encounters:   10/22/18 49.4 kg (108 lb 12.8 oz)   08/20/18 48.7 kg (107 lb 6.4 oz)   10/05/17 47.3 kg (104 lb 3.2 oz)      Body mass index is 18.97 kg/m².    PHYSICAL EXAMINATION    Physical Exam   Constitutional: She is oriented to person, place, and time. Vital signs are normal. She appears well-developed and well-nourished. She is cooperative. She does not appear ill. No distress.   HENT:   Head: Normocephalic and atraumatic.   Right Ear: Hearing, tympanic membrane, external ear and ear canal normal.   Left Ear: Hearing, tympanic membrane, external ear and ear canal normal.   Nose: Nose normal.   Mouth/Throat: Uvula is midline, oropharynx is clear and moist and mucous membranes are normal. No oral lesions. Tonsils are 0 on the right. Tonsils are 0 on the left.   Eyes: Pupils are equal, round, and reactive to light. EOM are normal.   Neck: Full passive range of motion without pain. Carotid bruit is not present. No thyroid mass and no thyromegaly present.   Cardiovascular: Normal rate, regular rhythm, S1 normal and normal heart sounds.    No murmur heard.  Pulmonary/Chest: Effort normal and breath sounds normal. She has no decreased breath sounds. She has no " wheezes. She has no rhonchi. She has no rales.   Abdominal: Soft. Normal appearance and bowel sounds are normal. She exhibits no abdominal bruit. There is no hepatosplenomegaly. There is no tenderness.   Lymphadenopathy:     She has no cervical adenopathy.     She has no axillary adenopathy.        Right: No supraclavicular adenopathy present.        Left: No supraclavicular adenopathy present.   Neurological: She is alert and oriented to person, place, and time. She has normal strength. She is not disoriented. No cranial nerve deficit or sensory deficit.   Reflex Scores:       Bicep reflexes are 2+ on the right side and 2+ on the left side.       Brachioradialis reflexes are 2+ on the right side and 2+ on the left side.       Patellar reflexes are 2+ on the right side and 2+ on the left side.       Achilles reflexes are 2+ on the right side and 2+ on the left side.  Skin: Skin is warm, dry and intact.   Psychiatric: She has a normal mood and affect. Her speech is normal and behavior is normal. Judgment and thought content normal. Cognition and memory are normal.   Nursing note and vitals reviewed.        REVIEWED DATA    Labs:    Lab Results   Component Value Date     09/06/2017    K 4.4 09/06/2017    AST 17 09/06/2017    ALT 13 09/06/2017    BUN 15 09/06/2017    CREATININE 0.73 09/06/2017    EGFRIFNONA 86 09/06/2017    EGFRIFAFRI 104 09/06/2017       Lab Results   Component Value Date    TSH 2.040 09/06/2017    FREET4 1.15 09/06/2017       Lab Results   Component Value Date     (H) 09/06/2017    HDL 89 (H) 09/06/2017    TRIG 69 09/06/2017    CHOLHDLRATIO 2.33 09/06/2017       Lab Results   Component Value Date    RCJY60TC 36.4 09/06/2017        Lab Results   Component Value Date    WBC 5.26 09/06/2017    HGB 13.0 09/06/2017    MCV 97.8 09/06/2017     09/06/2017       Lab Results   Component Value Date    LEUKOCYTESUR Small (1+) (A) 08/20/2018        No results found for:  HEPCVIRUSABY    Imaging:        Medical Tests:      ______________________________________________________________________    ASSESSMENT & PLAN    ANNUAL WELLNESS EXAM / PHYSICAL     Other medical problems addressed today:      Summary/Discussion:       1. Annual physical exam    - CBC & Differential  - Comprehensive Metabolic Panel  - Lipid Panel With / Chol / HDL Ratio  - TSH Rfx On Abnormal To Free T4    2. Preventative health care    - CBC & Differential  - Comprehensive Metabolic Panel  - Lipid Panel With / Chol / HDL Ratio  - TSH Rfx On Abnormal To Free T4    3. Routine adult health maintenance    - CBC & Differential  - Comprehensive Metabolic Panel  - Lipid Panel With / Chol / HDL Ratio  - TSH Rfx On Abnormal To Free T4    4. Screening for colon cancer    - Ambulatory Referral For Screening Colonoscopy      Return in about 1 year (around 10/22/2019) for Annual physical, Next scheduled follow up.    Future Appointments  Date Time Provider Department Center   10/16/2019 8:20 AM [facility] None   10/23/2019 8:00 AM Lissett Gavin APRN [facility] None       HEALTHCARE MAINTENANCE ISSUES:    Cancer Screening:  · Colon: Initial/Next screening at age: OVERDUE  · Repeat colonoscopy N/A at this time  · Breast: Recommended monthly self exams; annual professional exam  · Mammogram: every 1 year  · Cervical: 3 years  · Skin: Monthly self skin examination, annual exam by health professional  · Lung:   · Other:    Screening Labs & Tests:  · Lab results reviewed & discussed with the patient or test orders placed today.  · EKG:  · Vascular Screening:   · DEXA (65+ or postmenopausal with risk factors):   · HEP C (If born [id number], or risk factors): Not indicated    Immunization/Vaccinations (to be given today unless deferred by patient)  · Influenza: Patient had the flu shot this season  · Hepatitis A: Up to date  · Tetanus/Pertussis: Up to date  · Pneumovax: Not needed at this time  · Prevnar  13: Not needed at this time  · Shingles: Not needed at this time  · Other:     Lifestyle Counseling:  · Lifestyle Modifications: Continue good lifestyle choices/modifications  · Safety Issues: Always wear seatbelt, Avoid texting while driving   · Use sunscreen, regular skin examination  · Recommended annual dental/vision examination.  · Emotional/Stress/Sleep: Reviewed and  given when appropriate      Health Maintenance   Topic Date Due   • PAP SMEAR  08/24/2017   • ANNUAL PHYSICAL  10/23/2019   • TDAP/TD VACCINES (2 - Td) 09/04/2025   • INFLUENZA VACCINE  Completed         **Sunny Disclaimer:   Much of this encounter note is an electronic transcription/translation of spoken language to printed text. The electronic translation of spoken language may permit erroneous, or at times, nonsensical words or phrases to be inadvertently transcribed. Although I have reviewed the note for such errors, some may still exist.

## 2018-12-12 ENCOUNTER — APPOINTMENT (OUTPATIENT)
Dept: WOMENS IMAGING | Facility: HOSPITAL | Age: 48
End: 2018-12-12

## 2018-12-12 PROCEDURE — 77067 SCR MAMMO BI INCL CAD: CPT | Performed by: RADIOLOGY

## 2019-10-15 ENCOUNTER — TELEPHONE (OUTPATIENT)
Dept: INTERNAL MEDICINE | Age: 49
End: 2019-10-15

## 2019-10-15 DIAGNOSIS — Z00.00 PREVENTATIVE HEALTH CARE: Primary | ICD-10-CM

## 2019-10-15 NOTE — TELEPHONE ENCOUNTER
Patient is scheduled for her CPE labs tomorrow but she has been on an antibiotic and steroids. Tomorrow is her last morning and evening dose. Should she reschedule the labs or is it ok for her to still come in as planned.  Please call her at [phone]

## 2019-10-16 ENCOUNTER — RESULTS ENCOUNTER (OUTPATIENT)
Dept: INTERNAL MEDICINE | Age: 49
End: 2019-10-16

## 2019-10-16 DIAGNOSIS — Z00.00 PREVENTATIVE HEALTH CARE: ICD-10-CM

## 2019-10-16 LAB
ALBUMIN SERPL-MCNC: 3.9 G/DL (ref 3.5–5.2)
ALBUMIN/GLOB SERPL: 1.7 G/DL
ALP SERPL-CCNC: 56 U/L (ref 39–117)
ALT SERPL-CCNC: 34 U/L (ref 1–33)
AST SERPL-CCNC: 26 U/L (ref 1–32)
BASOPHILS # BLD AUTO: 0.06 10*3/MM3 (ref 0–0.2)
BASOPHILS NFR BLD AUTO: 0.8 % (ref 0–1.5)
BILIRUB SERPL-MCNC: 0.3 MG/DL (ref 0.2–1.2)
BUN SERPL-MCNC: 9 MG/DL (ref 6–20)
BUN/CREAT SERPL: 15 (ref 7–25)
CALCIUM SERPL-MCNC: 8.5 MG/DL (ref 8.6–10.5)
CHLORIDE SERPL-SCNC: 103 MMOL/L (ref 98–107)
CHOLEST SERPL-MCNC: 170 MG/DL (ref 0–200)
CHOLEST/HDLC SERPL: 2.93 {RATIO}
CO2 SERPL-SCNC: 24.1 MMOL/L (ref 22–29)
CREAT SERPL-MCNC: 0.6 MG/DL (ref 0.57–1)
EOSINOPHIL # BLD AUTO: 0.04 10*3/MM3 (ref 0–0.4)
EOSINOPHIL NFR BLD AUTO: 0.5 % (ref 0.3–6.2)
ERYTHROCYTE [DISTWIDTH] IN BLOOD BY AUTOMATED COUNT: 12.1 % (ref 12.3–15.4)
GLOBULIN SER CALC-MCNC: 2.3 GM/DL
GLUCOSE SERPL-MCNC: 88 MG/DL (ref 65–99)
HBA1C MFR BLD: 4.6 % (ref 4.8–5.6)
HCT VFR BLD AUTO: 37.4 % (ref 34–46.6)
HDLC SERPL-MCNC: 58 MG/DL (ref 40–60)
HGB BLD-MCNC: 12.2 G/DL (ref 12–15.9)
IMM GRANULOCYTES # BLD AUTO: 0.03 10*3/MM3 (ref 0–0.05)
IMM GRANULOCYTES NFR BLD AUTO: 0.4 % (ref 0–0.5)
LDLC SERPL CALC-MCNC: 86 MG/DL (ref 0–100)
LYMPHOCYTES # BLD AUTO: 1.33 10*3/MM3 (ref 0.7–3.1)
LYMPHOCYTES NFR BLD AUTO: 16.9 % (ref 19.6–45.3)
MCH RBC QN AUTO: 32.1 PG (ref 26.6–33)
MCHC RBC AUTO-ENTMCNC: 32.6 G/DL (ref 31.5–35.7)
MCV RBC AUTO: 98.4 FL (ref 79–97)
MONOCYTES # BLD AUTO: 0.81 10*3/MM3 (ref 0.1–0.9)
MONOCYTES NFR BLD AUTO: 10.3 % (ref 5–12)
NEUTROPHILS # BLD AUTO: 5.61 10*3/MM3 (ref 1.7–7)
NEUTROPHILS NFR BLD AUTO: 71.1 % (ref 42.7–76)
NRBC BLD AUTO-RTO: 0 /100 WBC (ref 0–0.2)
PLATELET # BLD AUTO: 259 10*3/MM3 (ref 140–450)
POTASSIUM SERPL-SCNC: 4.3 MMOL/L (ref 3.5–5.2)
PROT SERPL-MCNC: 6.2 G/DL (ref 6–8.5)
RBC # BLD AUTO: 3.8 10*6/MM3 (ref 3.77–5.28)
SODIUM SERPL-SCNC: 140 MMOL/L (ref 136–145)
TRIGL SERPL-MCNC: 129 MG/DL (ref 0–150)
TSH SERPL DL<=0.005 MIU/L-ACNC: 2.32 UIU/ML (ref 0.27–4.2)
VLDLC SERPL CALC-MCNC: 25.8 MG/DL
WBC # BLD AUTO: 7.88 10*3/MM3 (ref 3.4–10.8)

## 2019-10-20 ENCOUNTER — RESULTS ENCOUNTER (OUTPATIENT)
Dept: INTERNAL MEDICINE | Age: 49
End: 2019-10-20

## 2019-10-20 DIAGNOSIS — Z00.00 PREVENTATIVE HEALTH CARE: ICD-10-CM

## 2019-10-21 NOTE — PROGRESS NOTES
Chante Brendon / 49 y.o. / female  Encounter Date: 10/23/2019    CC:  No chief complaint on file.      HPI:      Chante presents for annual health maintenance visit.  Acute Complaint: Persistent dry cough x 4 days. Minimal headache, PND, no production. Denies fever, chills, n/v/d, sore throat, wheezing or SOA. She is taking flonase, stahist, and singulair daily for recurrent allergic rhinitis. She typically gets bronchitis or sinus infection seasonally. She was recently exposed to secondhand smoke this weekend that typically causes her bronchial irritation. She is no longer exposed to tobacco smoke. History of mild asthma, uses inhaler with exercise.     · Last health maintenance visit: 1 year ago  · General health: good  · Lifestyle:  · Attempting to lose weight?: No   · Diet: overall healthy  · Exercise: walks regularly and exercises 1-2 days/week   · Tobacco: Never used   · Alcohol: regular (moderate)  · Work: Full-time  · Reproductive health:  · Sexually active?: Yes   · Sexual problems?: No problems  · Concern for STD?: No    · Sees Gynecologist?: Yes   · Corry/Postmenopausal?: Yes   · Domestic abuse concerns: No   · Depression Screening:      PHQ-2/PHQ-9 Depression Screening 10/22/2018   Little interest or pleasure in doing things 0   Feeling down, depressed, or hopeless 0   Total Score 0         PHQ-2: 0 (Not depressed)   PHQ-9: 0 (Negative screening for depression)    Patient Care Team:  Lissett Gavin APRN as PCP - General (Internal Medicine)  Jasmyne Johnston MD as Consulting Physician (Obstetrics and Gynecology)  Devaughn Obrien MD as Consulting Physician (Urology)  Michael Mora MD as Consulting Physician (Allergy)  ______________________________________________________________________    ALLERGIES  No Known Allergies     MEDICATIONS  Current Outpatient Medications on File Prior to Visit   Medication Sig   • cetirizine (zyrTEC) 10 MG tablet Take 10 mg by mouth As Needed.   •  Chlorcyclizine-Pseudoephed (STAHIST AD PO) Take  by mouth Daily.   • cholecalciferol (VITAMIN D3) 1000 units tablet Take 1,000 Units by mouth Daily.   • fluticasone (FLONASE) 50 MCG/ACT nasal spray    • FOLIC ACID PO Take  by mouth.   • meloxicam (MOBIC) 7.5 MG tablet Take 1 tablet by mouth Daily.   • Omega-3 Fatty Acids (FISH OIL) 1000 MG capsule capsule Take  by mouth Daily With Breakfast.   • vitamin C (ASCORBIC ACID) 500 MG tablet Take 500 mg by mouth Daily.     No current facility-administered medications on file prior to visit.        PFSH:     The following portions of the patient's history were reviewed and updated as appropriate: Allergies / Current Medications / Past Medical History / Surgical History / Social History / Family History    PROBLEM LIST   There is no problem list on file for this patient.      PAST MEDICAL HISTORY  Past Medical History:   Diagnosis Date   • Allergic    • Kidney stone        SURGICAL HISTORY  Past Surgical History:   Procedure Laterality Date   • PARTIAL HYSTERECTOMY N/A 11/2015   • TONSILLECTOMY         SOCIAL HISTORY  Social History     Socioeconomic History   • Marital status:      Spouse name: Not on file   • Number of children: 0   • Years of education: Not on file   • Highest education level: Not on file   Occupational History   • Occupation:     Tobacco Use   • Smoking status: Never Smoker   • Smokeless tobacco: Never Used   Substance and Sexual Activity   • Alcohol use: Yes     Comment: 1 glasses every night; caffeine use   • Drug use: No   • Sexual activity: Yes     Partners: Male       FAMILY HISTORY  Family History   Problem Relation Age of Onset   • Lung cancer Maternal Grandmother    • Prostate cancer Maternal Grandfather    • Cancer Maternal Grandfather    • Hypertension Mother    • Hypertension Father    • Heart disease Father    • Cancer Paternal Grandmother        IMMUNIZATION HISTORY  Immunization History   Administered Date(s)  Administered   • Flu Mist 10/28/2016, 10/30/2017   • Influenza, Unspecified 09/28/2018       ______________________________________________________________________    REVIEW OF SYSTEMS    Review of Systems   Constitutional: Negative for fatigue.   HENT: Negative.    Respiratory: Positive for cough. Negative for shortness of breath and wheezing.    Cardiovascular: Negative for chest pain and palpitations.   Gastrointestinal: Negative for constipation, diarrhea and nausea.   Endocrine: Negative.    Genitourinary: Negative.    Musculoskeletal: Negative for arthralgias and myalgias.   Skin: Negative.    Neurological: Negative.          VITALS:    There were no vitals taken for this visit.    BP Readings from Last 3 Encounters:   10/22/18 146/86   08/20/18 112/78   10/05/17 118/74     Wt Readings from Last 3 Encounters:   10/22/18 49.4 kg (108 lb 12.8 oz)   08/20/18 48.7 kg (107 lb 6.4 oz)   10/05/17 47.3 kg (104 lb 3.2 oz)      There is no height or weight on file to calculate BMI.    PHYSICAL EXAMINATION    Physical Exam   Constitutional: She is oriented to person, place, and time. She appears well-developed and well-nourished. No distress.   HENT:   Head: Normocephalic.   Right Ear: External ear normal.   Left Ear: External ear normal.   Mouth/Throat: Oropharyngeal exudate (mild) present.   Eyes: Conjunctivae and EOM are normal. Pupils are equal, round, and reactive to light.   Neck: Normal range of motion. Neck supple.   Cardiovascular: Normal rate, regular rhythm, normal heart sounds and intact distal pulses.   Pulmonary/Chest: Effort normal and breath sounds normal. She has no wheezes.   Dry cough     Abdominal: Soft. Bowel sounds are normal. She exhibits no distension and no mass. There is no tenderness.   Musculoskeletal: Normal range of motion. She exhibits no edema.   Lymphadenopathy:     She has no cervical adenopathy.   Neurological: She is alert and oriented to person, place, and time. No sensory deficit.  Coordination normal.   Skin: Skin is warm and dry.   Psychiatric: She has a normal mood and affect.   Vitals reviewed.    REVIEWED DATA    Labs:    Lab Results   Component Value Date     10/16/2019    K 4.3 10/16/2019    AST 26 10/16/2019    ALT 34 (H) 10/16/2019    BUN 9 10/16/2019    CREATININE 0.60 10/16/2019    CREATININE 0.64 10/22/2018    CREATININE 0.73 09/06/2017    EGFRIFNONA 107 10/16/2019    EGFRIFAFRI 129 10/16/2019       Lab Results   Component Value Date    HGBA1C 4.60 (L) 10/16/2019    TSH 2.320 10/16/2019    FREET4 1.15 09/06/2017       Lab Results   Component Value Date    LDL 86 10/16/2019    HDL 58 10/16/2019    TRIG 129 10/16/2019    CHOLHDLRATIO 2.93 10/16/2019       Lab Results   Component Value Date    LRLU49BV 36.4 09/06/2017        Lab Results   Component Value Date    WBC 7.88 10/16/2019    HGB 12.2 10/16/2019    MCV 98.4 (H) 10/16/2019     10/16/2019       Lab Results   Component Value Date    LEUKOCYTESUR Small (1+) (A) 08/20/2018        No results found for: HEPCVIRUSABY    Imaging:        Medical Tests:        ______________________________________________________________________    ASSESSMENT & PLAN    ANNUAL WELLNESS EXAM / PHYSICAL     Other medical problems addressed today:    Acute bronchitis, unspecified organism  - Take short course steroids for 5 days. Advised patient on potential side effects of oral steroids including: elevated blood glucose, increased hunger, insomnia, mood swings, personality changes while on the medication.   - predniSONE (DELTASONE) 20 MG tablet; Take 1 tablet by mouth Daily.  Dispense: 5 tablet; Refill: 0    Summary/Discussion:     Repeat Calcium level due to previous low reading on routine labs. Advised patient to increase calcium in diet with leafy green vegetables.     No Follow-up on file.    Future Appointments   Date Time Provider Department Center   10/23/2019  9:30 AM Teresa Enciso APRN [facility] None       HEALTHCARE MAINTENANCE  ISSUES:    Cancer Screening:  · Colon: Initial/Next screening at age: 51  · Repeat colon cancer screening: N/A at this time  · Breast: Recommended monthly self exams; annual professional exam  · Mammogram: every 1 year  · Cervical: pelvic exam as recommended by gyne  · Skin: Monthly self skin examination, annual exam by health professional. Skin checks annually Dr. Butler, [facility]   · Lung:   · Other:    Screening Labs & Tests:  · Lab results reviewed & discussed with the patient or test orders placed today.  · EKG:  · Vascular Screening:   · DEXA (65+ or postmenopausal with risk factors):   · HEP C (If born 5211-7671, or risk factors): Not indicated    Immunization/Vaccinations (to be given today unless deferred by patient)  · Influenza: Recommended annual influenza vaccine  · Hepatitis A: Up to date   · Tetanus/Pertussis: Up to date  · Pneumovax: Not needed at this time  · Prevnar 13: Not needed at this time  · Shingles: Recommended Shingrix at pharmacy  · Other:     Lifestyle Counseling:  · Lifestyle Modifications: Continue good lifestyle choices/modifications, Increase intensity/regularity of aerobic exercise, Maintain a low sugar/carbohydrate diet, Follow a low fat, low cholesterol diet, Maintain low sodium diet (< 3 gm) for blood pressure, Make dinner the lightest meal of day, Decrease or avoid alcohol intake, Continue to abstain from smoking, Reduce exposure to stress, Improve sleep hygiene, Manage stress/anxiety better and Discussed safe sex practices, contraception  · Safety Issues: Always wear seatbelt, Avoid texting while driving   · Use sunscreen, regular skin examination  · Recommended annual dental/vision examination.  · Emotional/Stress/Sleep: Reviewed and  given when appropriate      Health Maintenance   Topic Date Due   • PAP SMEAR  08/24/2017   • INFLUENZA VACCINE  08/01/2019   • ANNUAL PHYSICAL  10/23/2019   • TDAP/TD VACCINES (2 - Td) 09/04/2025

## 2019-10-23 ENCOUNTER — OFFICE VISIT (OUTPATIENT)
Dept: INTERNAL MEDICINE | Age: 49
End: 2019-10-23

## 2019-10-23 VITALS
OXYGEN SATURATION: 99 % | WEIGHT: 106.8 LBS | SYSTOLIC BLOOD PRESSURE: 132 MMHG | BODY MASS INDEX: 18.23 KG/M2 | HEIGHT: 64 IN | TEMPERATURE: 98.5 F | HEART RATE: 88 BPM | DIASTOLIC BLOOD PRESSURE: 82 MMHG

## 2019-10-23 DIAGNOSIS — Z00.00 ROUTINE ADULT HEALTH MAINTENANCE: Primary | ICD-10-CM

## 2019-10-23 DIAGNOSIS — J20.9 ACUTE BRONCHITIS, UNSPECIFIED ORGANISM: ICD-10-CM

## 2019-10-23 LAB — CALCIUM SERPL-MCNC: 9.2 MG/DL (ref 8.6–10.5)

## 2019-10-23 PROCEDURE — 99396 PREV VISIT EST AGE 40-64: CPT | Performed by: NURSE PRACTITIONER

## 2019-10-23 PROCEDURE — 99212 OFFICE O/P EST SF 10 MIN: CPT | Performed by: NURSE PRACTITIONER

## 2019-10-23 RX ORDER — LEVONORGESTREL AND ETHINYL ESTRADIOL 0.1-0.02MG
KIT ORAL
Refills: 5 | COMMUNITY
Start: 2019-09-21 | End: 2022-08-15

## 2019-10-23 RX ORDER — PREDNISONE 20 MG/1
20 TABLET ORAL DAILY
Qty: 5 TABLET | Refills: 0 | Status: SHIPPED | OUTPATIENT
Start: 2019-10-23 | End: 2021-11-04

## 2019-10-23 RX ORDER — MONTELUKAST SODIUM 10 MG/1
10 TABLET ORAL DAILY
Refills: 9 | COMMUNITY
Start: 2019-10-17

## 2019-10-23 RX ORDER — CHLORCYCLIZINE HYDROCHLORIDE AND PSEUDOEPHEDRINE HYDROCHLORIDE 25; 60 MG/1; MG/1
1 TABLET ORAL 2 TIMES DAILY PRN
Refills: 3 | COMMUNITY
Start: 2019-10-15 | End: 2019-10-23 | Stop reason: SDUPTHER

## 2019-12-20 ENCOUNTER — APPOINTMENT (OUTPATIENT)
Dept: WOMENS IMAGING | Facility: HOSPITAL | Age: 49
End: 2019-12-20

## 2019-12-20 PROCEDURE — 77067 SCR MAMMO BI INCL CAD: CPT | Performed by: RADIOLOGY

## 2020-10-19 DIAGNOSIS — Z00.00 ROUTINE ADULT HEALTH MAINTENANCE: Primary | ICD-10-CM

## 2020-10-21 ENCOUNTER — TELEPHONE (OUTPATIENT)
Dept: INTERNAL MEDICINE | Age: 50
End: 2020-10-21

## 2020-10-21 NOTE — TELEPHONE ENCOUNTER
Patient was exposed to COVID-19 on 10/4/20. She got tested on the 10/9/20 and it came back positive. Can the pt still come in for her labs tomorrow at 8AM?

## 2020-10-22 ENCOUNTER — RESULTS ENCOUNTER (OUTPATIENT)
Dept: INTERNAL MEDICINE | Age: 50
End: 2020-10-22

## 2020-10-22 DIAGNOSIS — Z00.00 ROUTINE ADULT HEALTH MAINTENANCE: ICD-10-CM

## 2020-10-27 LAB
ALBUMIN SERPL-MCNC: 4.1 G/DL (ref 3.5–5.2)
ALBUMIN/GLOB SERPL: 2 G/DL
ALP SERPL-CCNC: 47 U/L (ref 39–117)
ALT SERPL-CCNC: 20 U/L (ref 1–33)
APPEARANCE UR: ABNORMAL
AST SERPL-CCNC: 19 U/L (ref 1–32)
BACTERIA #/AREA URNS HPF: ABNORMAL /HPF
BASOPHILS # BLD AUTO: 0.06 10*3/MM3 (ref 0–0.2)
BASOPHILS NFR BLD AUTO: 1.2 % (ref 0–1.5)
BILIRUB SERPL-MCNC: 0.5 MG/DL (ref 0–1.2)
BILIRUB UR QL STRIP: NEGATIVE
BUN SERPL-MCNC: 13 MG/DL (ref 6–20)
BUN/CREAT SERPL: 20 (ref 7–25)
CALCIUM SERPL-MCNC: 8.7 MG/DL (ref 8.6–10.5)
CHLORIDE SERPL-SCNC: 104 MMOL/L (ref 98–107)
CHOLEST SERPL-MCNC: 212 MG/DL (ref 0–200)
CHOLEST/HDLC SERPL: 3.21 {RATIO}
CO2 SERPL-SCNC: 25 MMOL/L (ref 22–29)
COLOR UR: ABNORMAL
CREAT SERPL-MCNC: 0.65 MG/DL (ref 0.57–1)
EOSINOPHIL # BLD AUTO: 0.02 10*3/MM3 (ref 0–0.4)
EOSINOPHIL NFR BLD AUTO: 0.4 % (ref 0.3–6.2)
EPI CELLS #/AREA URNS HPF: ABNORMAL /HPF
ERYTHROCYTE [DISTWIDTH] IN BLOOD BY AUTOMATED COUNT: 11.7 % (ref 12.3–15.4)
GLOBULIN SER CALC-MCNC: 2.1 GM/DL
GLUCOSE SERPL-MCNC: 89 MG/DL (ref 65–99)
GLUCOSE UR QL: NEGATIVE
HCT VFR BLD AUTO: 38.3 % (ref 34–46.6)
HDLC SERPL-MCNC: 66 MG/DL (ref 40–60)
HGB BLD-MCNC: 12.9 G/DL (ref 12–15.9)
HGB UR QL STRIP: ABNORMAL
IMM GRANULOCYTES # BLD AUTO: 0.01 10*3/MM3 (ref 0–0.05)
IMM GRANULOCYTES NFR BLD AUTO: 0.2 % (ref 0–0.5)
KETONES UR QL STRIP: NEGATIVE
LDLC SERPL CALC-MCNC: 129 MG/DL (ref 0–100)
LEUKOCYTE ESTERASE UR QL STRIP: ABNORMAL
LYMPHOCYTES # BLD AUTO: 1.8 10*3/MM3 (ref 0.7–3.1)
LYMPHOCYTES NFR BLD AUTO: 35.4 % (ref 19.6–45.3)
MCH RBC QN AUTO: 32.1 PG (ref 26.6–33)
MCHC RBC AUTO-ENTMCNC: 33.7 G/DL (ref 31.5–35.7)
MCV RBC AUTO: 95.3 FL (ref 79–97)
MONOCYTES # BLD AUTO: 0.54 10*3/MM3 (ref 0.1–0.9)
MONOCYTES NFR BLD AUTO: 10.6 % (ref 5–12)
NEUTROPHILS # BLD AUTO: 2.65 10*3/MM3 (ref 1.7–7)
NEUTROPHILS NFR BLD AUTO: 52.2 % (ref 42.7–76)
NITRITE UR QL STRIP: NEGATIVE
NRBC BLD AUTO-RTO: 0 /100 WBC (ref 0–0.2)
PH UR STRIP: 6.5 [PH] (ref 5–8)
PLATELET # BLD AUTO: 264 10*3/MM3 (ref 140–450)
POTASSIUM SERPL-SCNC: 4.3 MMOL/L (ref 3.5–5.2)
PROT SERPL-MCNC: 6.2 G/DL (ref 6–8.5)
PROT UR QL STRIP: ABNORMAL
RBC # BLD AUTO: 4.02 10*6/MM3 (ref 3.77–5.28)
RBC #/AREA URNS HPF: ABNORMAL /HPF
SODIUM SERPL-SCNC: 138 MMOL/L (ref 136–145)
SP GR UR: 1.02 (ref 1–1.03)
TRIGL SERPL-MCNC: 98 MG/DL (ref 0–150)
TSH SERPL DL<=0.005 MIU/L-ACNC: 1.99 UIU/ML (ref 0.27–4.2)
UROBILINOGEN UR STRIP-MCNC: ABNORMAL MG/DL
VLDLC SERPL CALC-MCNC: 17 MG/DL (ref 5–40)
WBC # BLD AUTO: 5.08 10*3/MM3 (ref 3.4–10.8)
WBC #/AREA URNS HPF: ABNORMAL /HPF

## 2020-10-29 ENCOUNTER — OFFICE VISIT (OUTPATIENT)
Dept: INTERNAL MEDICINE | Age: 50
End: 2020-10-29

## 2020-10-29 VITALS
HEART RATE: 80 BPM | SYSTOLIC BLOOD PRESSURE: 138 MMHG | HEIGHT: 63 IN | OXYGEN SATURATION: 100 % | TEMPERATURE: 97.3 F | BODY MASS INDEX: 19.31 KG/M2 | WEIGHT: 109 LBS | DIASTOLIC BLOOD PRESSURE: 80 MMHG

## 2020-10-29 DIAGNOSIS — Z00.00 ANNUAL PHYSICAL EXAM: Primary | ICD-10-CM

## 2020-10-29 DIAGNOSIS — Z12.11 COLON CANCER SCREENING: ICD-10-CM

## 2020-10-29 PROCEDURE — 99396 PREV VISIT EST AGE 40-64: CPT | Performed by: NURSE PRACTITIONER

## 2020-10-29 NOTE — PROGRESS NOTES
[facility]        Chante Brendon / 50 y.o. / female  10/29/2020    CC:  Annual Exam      HPI:      Chante presents for annual health maintenance visit.    · Last health maintenance visit: approximately 1 year ago  · General health: good  · Lifestyle:  · Attempting to lose weight?: No   · Diet: well-balanced   · Exercise: has not been as active recently (recent surgery in July)   · Tobacco: Never used   · Alcohol: occasional/social  · Work: Full-time ( [employer])    · Reproductive health:  · Sexually active?: Yes   · Sexual problems?: No problems  · Concern for STD?: No    · Sees Gynecologist?: Yes   · Corry/Postmenopausal?: No   · Domestic abuse concerns: No   · Depression Screening:      PHQ-2/PHQ-9 Depression Screening 10/29/2020   Little interest or pleasure in doing things 0   Feeling down, depressed, or hopeless 0   Total Score 0         PHQ-2: 0 (Not depressed)   PHQ-9: 0 (Negative screening for depression)    Patient Care Team:  Lissett Gavin APRN as PCP - General (Internal Medicine)  Jasmyne Johnston MD as Consulting Physician (Obstetrics and Gynecology)  Devaughn Obrien MD as Consulting Physician (Urology)  Michael Mora MD as Consulting Physician (Allergy)  Orville Cuellar MD as Consulting Physician (Dermatology)  ______________________________________________________________________    ALLERGIES  No Known Allergies     MEDICATIONS  Current Outpatient Medications on File Prior to Visit   Medication Sig   • cetirizine (zyrTEC) 10 MG tablet Take 10 mg by mouth As Needed.   • Chlorcyclizine-Pseudoephed (STAHIST AD PO) Take  by mouth Daily.   • cholecalciferol (VITAMIN D3) 1000 units tablet Take 1,000 Units by mouth Daily.   • fluticasone (FLONASE) 50 MCG/ACT nasal spray    • FOLIC ACID PO Take  by mouth.   • LARISSIA 0.1-20 MG-MCG per tablet TAKE 1 TABLET BY MOUTH DAILY, CONTINUOUSLY, SKIPPING PLACEBO PILLS   • montelukast (SINGULAIR) 10 MG tablet Take 10 mg by  mouth Daily.   • Omega-3 Fatty Acids (FISH OIL) 1000 MG capsule capsule Take  by mouth Daily With Breakfast.   • predniSONE (DELTASONE) 20 MG tablet Take 1 tablet by mouth Daily.   • vitamin C (ASCORBIC ACID) 500 MG tablet Take 500 mg by mouth Daily.     No current facility-administered medications on file prior to visit.        PFSH:     The following portions of the patient's history were reviewed and updated as appropriate: Allergies / Current Medications / Past Medical History / Surgical History / Social History / Family History    PROBLEM LIST   There is no problem list on file for this patient.      PAST MEDICAL HISTORY  Past Medical History:   Diagnosis Date   • Allergic    • Kidney stone        SURGICAL HISTORY  Past Surgical History:   Procedure Laterality Date   • BREAST IMPLANT SURGERY  07/10/2020   • SUBTOTAL HYSTERECTOMY N/A 11/2015   • TONSILLECTOMY         SOCIAL HISTORY  Social History     Socioeconomic History   • Marital status:      Spouse name: Not on file   • Number of children: 0   • Years of education: Not on file   • Highest education level: Not on file   Occupational History   • Occupation:     Tobacco Use   • Smoking status: Never Smoker   • Smokeless tobacco: Never Used   Substance and Sexual Activity   • Alcohol use: Yes     Comment: 1 glasses every night; caffeine use   • Drug use: No   • Sexual activity: Yes     Partners: Male       FAMILY HISTORY  Family History   Problem Relation Age of Onset   • Lung cancer Maternal Grandmother    • Prostate cancer Maternal Grandfather    • Cancer Maternal Grandfather    • Hypertension Mother    • Hypertension Father    • Heart disease Father    • Cancer Paternal Grandmother        IMMUNIZATION HISTORY  Immunization History   Administered Date(s) Administered   • Flu Mist 10/28/2016, 10/30/2017   • Influenza, Unspecified 09/28/2018       ______________________________________________________________________    REVIEW OF  "SYSTEMS    Review of Systems   Constitutional: Negative for activity change, appetite change, fatigue, fever and unexpected weight change.   HENT: Negative for congestion, ear pain, sore throat and trouble swallowing.    Eyes: Negative for visual disturbance.   Respiratory: Negative for shortness of breath.    Cardiovascular: Negative for chest pain and leg swelling.   Gastrointestinal: Negative for abdominal pain, blood in stool, constipation, diarrhea, nausea and vomiting.   Endocrine: Negative for polydipsia, polyphagia and polyuria.   Genitourinary: Negative for dysuria, pelvic pain, vaginal bleeding and vaginal discharge.   Musculoskeletal: Negative for arthralgias, back pain and gait problem.   Neurological: Negative for dizziness, weakness, numbness and headaches.   Hematological: Negative for adenopathy.   Psychiatric/Behavioral: Negative for confusion. The patient is not nervous/anxious.          VITALS:    Visit Vitals  /80   Pulse 80   Temp 97.3 °F (36.3 °C) (Temporal)   Ht 160 cm (63\")   Wt 49.4 kg (109 lb)   SpO2 100%   BMI 19.31 kg/m²       BP Readings from Last 3 Encounters:   10/29/20 138/80   10/23/19 132/82   10/22/18 146/86     Wt Readings from Last 3 Encounters:   10/29/20 49.4 kg (109 lb)   10/23/19 48.4 kg (106 lb 12.8 oz)   10/22/18 49.4 kg (108 lb 12.8 oz)      Body mass index is 19.31 kg/m².    PHYSICAL EXAMINATION    Physical Exam  Vitals signs and nursing note reviewed.   Constitutional:       General: She is not in acute distress.     Appearance: Normal appearance. She is well-developed. She is not ill-appearing.   HENT:      Head: Normocephalic and atraumatic.      Right Ear: Hearing, tympanic membrane, ear canal and external ear normal.      Left Ear: Hearing, tympanic membrane, ear canal and external ear normal.      Mouth/Throat:      Comments: Oral exam deferred due to COVID-19 pandemic and mask policy due to increased risk of aerosolized infection     Eyes:      Pupils: Pupils " are equal, round, and reactive to light.   Neck:      Musculoskeletal: Full passive range of motion without pain.      Thyroid: No thyroid mass or thyromegaly.      Vascular: No carotid bruit.   Cardiovascular:      Rate and Rhythm: Normal rate and regular rhythm.      Heart sounds: Normal heart sounds and S1 normal. No murmur.   Pulmonary:      Effort: Pulmonary effort is normal.      Breath sounds: Normal breath sounds. No decreased breath sounds, wheezing, rhonchi or rales.   Abdominal:      General: Bowel sounds are normal. There is no abdominal bruit.      Palpations: Abdomen is soft.      Tenderness: There is no abdominal tenderness.   Genitourinary:     Comments: GYN/CBE exam deferred to gynecology    Lymphadenopathy:      Cervical: No cervical adenopathy.      Upper Body:      Right upper body: No supraclavicular adenopathy.      Left upper body: No supraclavicular adenopathy.   Skin:     General: Skin is warm and dry.   Neurological:      Mental Status: She is alert and oriented to person, place, and time. She is not disoriented.      Cranial Nerves: No cranial nerve deficit.      Sensory: No sensory deficit.      Deep Tendon Reflexes:      Reflex Scores:       Bicep reflexes are 2+ on the right side and 2+ on the left side.       Brachioradialis reflexes are 2+ on the right side and 2+ on the left side.       Patellar reflexes are 2+ on the right side and 2+ on the left side.       Achilles reflexes are 2+ on the right side and 2+ on the left side.  Psychiatric:         Speech: Speech normal.         Behavior: Behavior normal. Behavior is cooperative.         Thought Content: Thought content normal.         Judgment: Judgment normal.           REVIEWED DATA    Labs:    Lab Results   Component Value Date     10/26/2020    K 4.3 10/26/2020    AST 19 10/26/2020    ALT 20 10/26/2020    BUN 13 10/26/2020    CREATININE 0.65 10/26/2020    CREATININE 0.60 10/16/2019    CREATININE 0.64 10/22/2018     EGFRIFNONA 97 10/26/2020    EGFRIFAFRI 117 10/26/2020       Lab Results   Component Value Date    HGBA1C 4.60 (L) 10/16/2019    TSH 1.990 10/26/2020    FREET4 1.15 09/06/2017       Lab Results   Component Value Date     (H) 10/26/2020    HDL 66 (H) 10/26/2020    TRIG 98 10/26/2020    CHOLHDLRATIO 3.21 10/26/2020       Lab Results   Component Value Date    XSSP82NA 36.4 09/06/2017        Lab Results   Component Value Date    WBC 5.08 10/26/2020    HGB 12.9 10/26/2020    MCV 95.3 10/26/2020     10/26/2020       Lab Results   Component Value Date    PROTEIN Trace (A) 10/26/2020    GLUCOSEU Negative 10/26/2020    BLOODU Trace (A) 10/26/2020    NITRITEU Negative 10/26/2020    LEUKOCYTESUR See below: (A) 10/26/2020        No results found for: HEPCVIRUSABY    Imaging:        Medical Tests:        ______________________________________________________________________    ASSESSMENT & PLAN    ANNUAL WELLNESS EXAM / PHYSICAL     Other medical problems addressed today:      Summary/Discussion:       1. Annual physical exam      2. Colon cancer screening    - Ambulatory Referral For Screening Colonoscopy        Return in about 1 year (around 10/29/2021) for Annual physical with fasting labs 1 week prior .    Future Appointments   Date Time Provider Department Center   11/4/2021  8:00 AM Lissett Gavin APRN [facility] None       HEALTHCARE MAINTENANCE ISSUES:    Cancer Screening:   · Colon: Initial/Next screening at age: OVERDUE  · Repeat colon cancer screening: N/A at this time  · Breast: Recommended monthly self exams; annual professional exam  · Mammogram: every 1 year (unable to have this year due to surgery)   · Cervical: 3 years  · Skin: Monthly self skin examination, annual exam by health professional  · Lung:   · Other:    Screening Labs & Tests:  · Lab results reviewed & discussed with the patient or test orders placed today.  · EKG:  · Vascular Screening:   · DEXA (65+ or postmenopausal with risk  factors):   · HEP C (If born 0928-9[birth year], or risk factors): Will check next time    Immunization/Vaccinations (to be given today unless deferred by patient)  · Influenza: Patient had the flu shot this season  · Hepatitis A: Not needed at this time  · Tetanus/Pertussis: Not needed at this time  · Pneumovax: Not needed at this time  · Prevnar 13: Not needed at this time  · Shingles: Not needed at this time  · Other:     Lifestyle Counseling:  · Lifestyle Modifications: Continue good lifestyle choices/modifications  · Safety Issues: Always wear seatbelt, Avoid texting while driving   · Use sunscreen, regular skin examination  · Recommended annual dental/vision examination.  · Emotional/Stress/Sleep: Reviewed and  given when appropriate      Health Maintenance   Topic Date Due   • COLONOSCOPY  1970   • HEPATITIS C SCREENING  08/24/2017   • ANNUAL PHYSICAL  10/24/2020   • PAP SMEAR  09/01/2023   • TDAP/TD VACCINES (2 - Td) 09/04/2025   • INFLUENZA VACCINE  Completed   • Pneumococcal Vaccine 0-64  Aged Out         **Dragon Disclaimer:   Much of this encounter note is an electronic transcription/translation of spoken language to printed text. The electronic translation of spoken language may permit erroneous, or at times, nonsensical words or phrases to be inadvertently transcribed. Although I have reviewed the note for such errors, some may still exist.

## 2021-03-24 ENCOUNTER — BULK ORDERING (OUTPATIENT)
Dept: CASE MANAGEMENT | Facility: OTHER | Age: 51
End: 2021-03-24

## 2021-03-24 DIAGNOSIS — Z23 IMMUNIZATION DUE: ICD-10-CM

## 2021-06-09 ENCOUNTER — TELEPHONE (OUTPATIENT)
Dept: INTERNAL MEDICINE | Age: 51
End: 2021-06-09

## 2021-06-09 NOTE — TELEPHONE ENCOUNTER
Contact patient.     Recommend can use OTC Monistat 3 or 7 day.   If that does not improve, she would need to be seen (or can see her GYN).

## 2021-06-09 NOTE — TELEPHONE ENCOUNTER
Caller: Chante Olivas    Relationship: Self    Best call back number: [phone]    What medication are you requesting: ANTIFUNGAL    What are your current symptoms: VAGINAL ITCHING     How long have you been experiencing symptoms: FEW DAYS     Have you had these symptoms before:    [x] Yes  [] No    Have you been treated for these symptoms before:   [x] Yes  [] No    If a prescription is needed, what is your preferred pharmacy and phone number: [id number] - [place], [place] - [address] - [phone]  - [phone]

## 2021-07-26 ENCOUNTER — APPOINTMENT (OUTPATIENT)
Dept: WOMENS IMAGING | Facility: HOSPITAL | Age: 51
End: 2021-07-26

## 2021-07-26 PROCEDURE — 77067 SCR MAMMO BI INCL CAD: CPT | Performed by: RADIOLOGY

## 2021-07-26 PROCEDURE — 77063 BREAST TOMOSYNTHESIS BI: CPT | Performed by: RADIOLOGY

## 2021-10-21 DIAGNOSIS — Z00.00 PREVENTATIVE HEALTH CARE: Primary | ICD-10-CM

## 2021-11-04 ENCOUNTER — OFFICE VISIT (OUTPATIENT)
Dept: INTERNAL MEDICINE | Age: 51
End: 2021-11-04

## 2021-11-04 VITALS
HEART RATE: 74 BPM | HEIGHT: 63 IN | OXYGEN SATURATION: 97 % | BODY MASS INDEX: 18.61 KG/M2 | SYSTOLIC BLOOD PRESSURE: 116 MMHG | DIASTOLIC BLOOD PRESSURE: 78 MMHG | WEIGHT: 105 LBS | TEMPERATURE: 97.5 F

## 2021-11-04 DIAGNOSIS — Z23 NEED FOR SHINGLES VACCINE: ICD-10-CM

## 2021-11-04 DIAGNOSIS — Z12.11 SCREENING FOR COLON CANCER: ICD-10-CM

## 2021-11-04 DIAGNOSIS — Z00.00 ANNUAL PHYSICAL EXAM: Primary | ICD-10-CM

## 2021-11-04 PROCEDURE — 90750 HZV VACC RECOMBINANT IM: CPT | Performed by: NURSE PRACTITIONER

## 2021-11-04 PROCEDURE — 99396 PREV VISIT EST AGE 40-64: CPT | Performed by: NURSE PRACTITIONER

## 2021-11-04 PROCEDURE — 90471 IMMUNIZATION ADMIN: CPT | Performed by: NURSE PRACTITIONER

## 2021-11-04 NOTE — PROGRESS NOTES
[facility] [place] [facility]        Chante Brendon / 51 y.o. / female  11/04/2021    CC:  Annual Exam      HPI:      Chante presents for annual health maintenance visit.    Hospitalized overnight earlier this year for pyelonephritis in [place].     · Last health maintenance visit: approximately 1 year ago  · General health: good  · Lifestyle:  · Attempting to lose weight?: No, weight down 4 lbs from past visit   · Diet: well balanced   · Exercise: walks regularly  · Tobacco: Never used   · Alcohol: occasional/infrequent and 1-2 occasions/week  · Work: Full-time     · Reproductive health:  · Sexually active?: Yes   · Sexual problems?: No problems  · Concern for STD?: No    · Sees Gynecologist?: Yes   · Corry/Postmenopausal?: Yes   · Domestic abuse concerns: No     · Depression Screening:      PHQ-2/PHQ-9 Depression Screening 11/4/2021   Little interest or pleasure in doing things 0   Feeling down, depressed, or hopeless 0   Total Score 0         PHQ-2: 0 (Not depressed)   PHQ-9: 0 (Negative screening for depression)    Patient Care Team:  Lissett Gavin APRN as PCP - General (Internal Medicine)  Jasmyne Johnston MD as Consulting Physician (Obstetrics and Gynecology)  Devaughn Obrien MD as Consulting Physician (Urology)  Michael Mora MD as Consulting Physician (Allergy)  Orville Cuellar MD as Consulting Physician (Dermatology)  ______________________________________________________________________    ALLERGIES  No Known Allergies     MEDICATIONS  Current Outpatient Medications on File Prior to Visit   Medication Sig   • cetirizine (zyrTEC) 10 MG tablet Take 10 mg by mouth As Needed.   • Chlorcyclizine-Pseudoephed (STAHIST AD PO) Take  by mouth Daily.   • cholecalciferol (VITAMIN D3) 1000 units tablet Take 1,000 Units by mouth Daily.   • fluticasone (FLONASE) 50 MCG/ACT nasal spray    • FOLIC ACID PO Take  by mouth.   • LARISSIA 0.1-20 MG-MCG per tablet TAKE 1 TABLET BY MOUTH DAILY, CONTINUOUSLY, SKIPPING  PLACEBO PILLS   • montelukast (SINGULAIR) 10 MG tablet Take 10 mg by mouth Daily.   • vitamin C (ASCORBIC ACID) 500 MG tablet Take 500 mg by mouth Daily.   • [DISCONTINUED] Omega-3 Fatty Acids (FISH OIL) 1000 MG capsule capsule Take  by mouth Daily With Breakfast.   • [DISCONTINUED] predniSONE (DELTASONE) 20 MG tablet Take 1 tablet by mouth Daily.     No current facility-administered medications on file prior to visit.       PFSH:     The following portions of the patient's history were reviewed and updated as appropriate: Allergies / Current Medications / Past Medical History / Surgical History / Social History / Family History    PROBLEM LIST   There is no problem list on file for this patient.      PAST MEDICAL HISTORY  Past Medical History:   Diagnosis Date   • Allergic    • Kidney stone        SURGICAL HISTORY  Past Surgical History:   Procedure Laterality Date   • BREAST IMPLANT SURGERY  07/10/2020   • SUBTOTAL HYSTERECTOMY N/A 11/2015   • TONSILLECTOMY         SOCIAL HISTORY  Social History     Socioeconomic History   • Marital status:    • Number of children: 0   Tobacco Use   • Smoking status: Never Smoker   • Smokeless tobacco: Never Used   Substance and Sexual Activity   • Alcohol use: Yes     Comment: 1 glasses every night; caffeine use   • Drug use: No   • Sexual activity: Yes     Partners: Male       FAMILY HISTORY  Family History   Problem Relation Age of Onset   • Lung cancer Maternal Grandmother    • Prostate cancer Maternal Grandfather    • Cancer Maternal Grandfather    • Hypertension Mother    • Hypertension Father    • Heart disease Father    • Cancer Paternal Grandmother        IMMUNIZATION HISTORY  Immunization History   Administered Date(s) Administered   • COVID-19 (PFIZER) 03/10/2021, 03/31/2021, 10/18/2021   • Flu Vaccine Quad PF >18YRS 10/30/2017   • FluMist 2-49yrs 10/28/2016, 10/30/2017   • Influenza, Unspecified 09/28/2018, 09/28/2018   • Tdap 11/01/2012  "      ______________________________________________________________________    REVIEW OF SYSTEMS    Review of Systems   Constitutional: Negative for activity change, appetite change, fatigue, fever and unexpected weight change.   HENT: Negative for congestion, ear pain, sore throat and trouble swallowing.    Eyes: Negative for visual disturbance.   Respiratory: Negative for shortness of breath.    Cardiovascular: Negative for chest pain and leg swelling.   Gastrointestinal: Negative for abdominal pain, blood in stool, constipation, diarrhea, nausea and vomiting.   Endocrine: Negative for polydipsia, polyphagia and polyuria.   Genitourinary: Negative for dysuria, pelvic pain, vaginal bleeding and vaginal discharge.   Musculoskeletal: Negative for arthralgias, back pain and gait problem.   Neurological: Negative for dizziness, weakness, numbness and headaches.   Hematological: Negative for adenopathy.   Psychiatric/Behavioral: Negative for confusion. The patient is not nervous/anxious.          VITALS:    Visit Vitals  /78   Pulse 74   Temp 97.5 °F (36.4 °C) (Temporal)   Ht 160 cm (62.99\")   Wt 47.6 kg (105 lb)   SpO2 97%   BMI 18.60 kg/m²       BP Readings from Last 3 Encounters:   11/04/21 116/78   10/29/20 138/80   10/23/19 132/82     Wt Readings from Last 3 Encounters:   11/04/21 47.6 kg (105 lb)   10/29/20 49.4 kg (109 lb)   10/23/19 48.4 kg (106 lb 12.8 oz)      Body mass index is 18.6 kg/m².    PHYSICAL EXAMINATION    Physical Exam  Vitals and nursing note reviewed.   Constitutional:       General: She is not in acute distress.     Appearance: Normal appearance. She is well-developed. She is not ill-appearing.   HENT:      Head: Normocephalic and atraumatic.      Comments: Oral exam deferred due to COVID-19 pandemic and mask policy due to increased risk of aerosolized infection        Right Ear: Hearing, tympanic membrane, ear canal and external ear normal.      Left Ear: Hearing, tympanic membrane, ear " canal and external ear normal.   Eyes:      Pupils: Pupils are equal, round, and reactive to light.   Neck:      Thyroid: No thyroid mass or thyromegaly.      Vascular: No carotid bruit.   Cardiovascular:      Rate and Rhythm: Normal rate and regular rhythm.      Heart sounds: Normal heart sounds and S1 normal. No murmur heard.      Pulmonary:      Effort: Pulmonary effort is normal.      Breath sounds: Normal breath sounds. No decreased breath sounds, wheezing, rhonchi or rales.   Chest:   Breasts:      Right: No supraclavicular adenopathy.      Left: No supraclavicular adenopathy.       Abdominal:      General: Bowel sounds are normal. There is no abdominal bruit.      Palpations: Abdomen is soft.      Tenderness: There is no abdominal tenderness.   Musculoskeletal:      Cervical back: Full passive range of motion without pain.   Lymphadenopathy:      Cervical: No cervical adenopathy.      Upper Body:      Right upper body: No supraclavicular adenopathy.      Left upper body: No supraclavicular adenopathy.   Skin:     General: Skin is warm and dry.   Neurological:      Mental Status: She is alert and oriented to person, place, and time. She is not disoriented.      Cranial Nerves: No cranial nerve deficit.      Sensory: No sensory deficit.   Psychiatric:         Speech: Speech normal.         Behavior: Behavior normal. Behavior is cooperative.         Thought Content: Thought content normal.         Judgment: Judgment normal.           REVIEWED DATA    Labs:    Lab Results   Component Value Date     10/26/2021    K 4.8 10/26/2021    AST 20 10/26/2021    ALT 12 10/26/2021    BUN 11 10/26/2021    CREATININE 0.74 10/26/2021    CREATININE 0.65 10/26/2020    CREATININE 0.60 10/16/2019    EGFRIFNONA 95 10/26/2021    EGFRIFAFRI 109 10/26/2021       Lab Results   Component Value Date    GLUCOSE 88 10/26/2021    HGBA1C 4.60 (L) 10/16/2019    TSH 2.120 10/26/2021    FREET4 1.15 09/06/2017       Lab Results   Component  Value Date     (H) 10/26/2021    HDL 61 10/26/2021    TRIG 148 10/26/2021    CHOLHDLRATIO 3.2 10/26/2021       Lab Results   Component Value Date    HRPL98ME 36.4 09/06/2017        Lab Results   Component Value Date    WBC 6.9 10/26/2021    HGB 13.1 10/26/2021    MCV 97 10/26/2021     10/26/2021       Lab Results   Component Value Date    PROTEIN Trace 10/26/2021    GLUCOSEU Negative 10/26/2021    BLOODU Trace (A) 10/26/2021    NITRITEU Negative 10/26/2021    LEUKOCYTESUR 1+ (A) 10/26/2021        No results found for: HEPCVIRUSABY    Imaging:        Medical Tests:        ______________________________________________________________________    ASSESSMENT & PLAN    ANNUAL WELLNESS EXAM / PHYSICAL     Other medical problems addressed today:      Summary/Discussion:       1. Annual physical exam      2. Screening for colon cancer    - Ambulatory Referral For Screening Colonoscopy    3. Need for shingles vaccine    - Shingrix Vaccine        Return in about 1 year (around 11/4/2022) for Annual physical with fasting labs 1 week prior, Shingles vaccine #2 end of January .    No future appointments.    HEALTHCARE MAINTENANCE ISSUES:    Cancer Screening:  · Colon: Initial/Next screening at age: OVERDUE  · Repeat colon cancer screening: N/A at this time  · Breast: Recommended monthly self exams; annual professional exam  · Mammogram: every 1 year  · Cervical: pelvic exam as recommended by gyne  · Skin: Monthly self skin examination, annual exam by health professional  · Lung:   · Other:    Screening Labs & Tests:  · Lab results reviewed & discussed with the patient or test orders placed today.  · EKG:  · Vascular Screening:   · DEXA (65+ or postmenopausal with risk factors):   · HEP C (If born [birth year]349[birth year], or risk factors): Will check next time    Immunization/Vaccinations (to be given today unless deferred by patient)  · Influenza: Patient had the flu shot this season  · Hepatitis A: Not needed at this  time  · Tetanus/Pertussis: Up to date  · Pneumovax: Not needed at this time  · Prevnar 13: Not needed at this time  · Shingles: 1st Shingrix will be given today (2nd shot in 2-6 months)  · Other:     Lifestyle Counseling:  · Lifestyle Modifications: Continue good lifestyle choices/modifications  · Safety Issues: Always wear seatbelt, Avoid texting while driving   · Use sunscreen, regular skin examination  · Recommended annual dental/vision examination.  · Emotional/Stress/Sleep: Reviewed and  given when appropriate      Health Maintenance   Topic Date Due   • COLORECTAL CANCER SCREENING  Never done   • HEPATITIS C SCREENING  Never done   • ZOSTER VACCINE (1 of 2) Never done   • ANNUAL PHYSICAL  11/05/2022   • MAMMOGRAM  07/01/2023   • PAP SMEAR  09/01/2023   • TDAP/TD VACCINES (3 - Td or Tdap) 09/04/2025   • COVID-19 Vaccine  Completed   • INFLUENZA VACCINE  Completed   • Pneumococcal Vaccine 0-64  Aged Out         **Dragon Disclaimer:   Much of this encounter note is an electronic transcription/translation of spoken language to printed text. The electronic translation of spoken language may permit erroneous, or at times, nonsensical words or phrases to be inadvertently transcribed. Although I have reviewed the note for such errors, some may still exist.

## 2021-11-04 NOTE — PATIENT INSTRUCTIONS

## 2022-01-24 ENCOUNTER — CLINICAL SUPPORT (OUTPATIENT)
Dept: INTERNAL MEDICINE | Age: 52
End: 2022-01-24

## 2022-01-24 DIAGNOSIS — Z23 NEED FOR SHINGLES VACCINE: Primary | ICD-10-CM

## 2022-01-24 PROCEDURE — 90750 HZV VACC RECOMBINANT IM: CPT | Performed by: NURSE PRACTITIONER

## 2022-01-28 NOTE — PROGRESS NOTES
,.    Answers for HPI/ROS submitted by the patient on 1/17/2022  Please describe your symptoms.: No symptoms.   Getting shingles vaccine  Have you had these symptoms before?: No  How long have you been having these symptoms?: 1-4 days  Please list any medications you are currently taking for this condition.: N/A  Please describe any probable cause for these symptoms. : N/A  What is the primary reason for your visit?: Other

## 2022-08-15 ENCOUNTER — OFFICE VISIT (OUTPATIENT)
Dept: INTERNAL MEDICINE | Age: 52
End: 2022-08-15

## 2022-08-15 VITALS
TEMPERATURE: 98.4 F | SYSTOLIC BLOOD PRESSURE: 128 MMHG | HEART RATE: 59 BPM | OXYGEN SATURATION: 96 % | RESPIRATION RATE: 16 BRPM | HEIGHT: 63 IN | BODY MASS INDEX: 19.14 KG/M2 | WEIGHT: 108 LBS | DIASTOLIC BLOOD PRESSURE: 80 MMHG

## 2022-08-15 DIAGNOSIS — N20.0 NEPHROLITHIASIS: Primary | ICD-10-CM

## 2022-08-15 DIAGNOSIS — Z12.11 SCREENING FOR MALIGNANT NEOPLASM OF COLON: ICD-10-CM

## 2022-08-15 DIAGNOSIS — Z91.09 ENVIRONMENTAL ALLERGIES: ICD-10-CM

## 2022-08-15 PROCEDURE — 99214 OFFICE O/P EST MOD 30 MIN: CPT | Performed by: NURSE PRACTITIONER

## 2022-08-15 NOTE — PROGRESS NOTES
"    I N T E R N A L  M E D I C I N E  BARBARA BRO, APRRUBIN      ENCOUNTER DATE:  08/15/2022    Chante Olivas / 52 y.o. / female      CHIEF COMPLAINT / REASON FOR OFFICE VISIT     Establish Care, Allergies, and Nephrolithiasis      ASSESSMENT & PLAN     1. Environmental allergies  -Continue management with allergist, Zyrtec, Flonase, Singulair    2. Nephrolithiasis  - Followed by urology, asymptomatic today    3. Screening for malignant neoplasm of colon  - Ambulatory Referral For Screening Colonoscopy    Orders Placed This Encounter   Procedures   • Ambulatory Referral For Screening Colonoscopy     No orders of the defined types were placed in this encounter.      SUMMARY/DISCUSSION  • Follow-up for annual physical as scheduled     Next Appointment with me: 11/10/2022    Return for Annual physical needs to rechedule, may combine 2 15 .      VITAL SIGNS     Visit Vitals  /80 (BP Location: Left arm, Patient Position: Sitting, Cuff Size: Adult)   Pulse 59   Temp 98.4 °F (36.9 °C) (Temporal)   Resp 16   Ht 160 cm (62.99\")   Wt 49 kg (108 lb)   SpO2 96%   BMI 19.14 kg/m²     Wt Readings from Last 3 Encounters:   08/15/22 49 kg (108 lb)   11/04/21 47.6 kg (105 lb)   10/29/20 49.4 kg (109 lb)     Body mass index is 19.14 kg/m².      MEDICATIONS AT THE TIME OF OFFICE VISIT     Current Outpatient Medications on File Prior to Visit   Medication Sig   • cetirizine (zyrTEC) 10 MG tablet Take 10 mg by mouth As Needed.   • Chlorcyclizine-Pseudoephed (STAHIST AD PO) Take  by mouth Daily.   • cholecalciferol (VITAMIN D3) 1000 units tablet Take 1,000 Units by mouth Daily.   • ELDERBERRY PO Take  by mouth.   • fluticasone (FLONASE) 50 MCG/ACT nasal spray    • FOLIC ACID PO Take  by mouth.   • montelukast (SINGULAIR) 10 MG tablet Take 10 mg by mouth Daily.   • vitamin C (ASCORBIC ACID) 500 MG tablet Take 500 mg by mouth Daily.   • [DISCONTINUED] LARISSIA 0.1-20 MG-MCG per tablet TAKE 1 TABLET BY MOUTH DAILY, CONTINUOUSLY, SKIPPING " PLACEBO PILLS     No current facility-administered medications on file prior to visit.         HISTORY OF PRESENT ILLNESS     Establish care with new provider.  Previous patient Lissett ANAND followed yearly for routine annual physical.  Overall annual fasting labs have been unremarkable except for extremely mild elevation in LDL with good HDL.  Routine exercise, healthy diet.  Normal weight range.  Followed also routinely by allergist with daily regimen of Zyrtec, Stahist, montelukast and Flonase.  Followed by urologist with 1 year follow-up due to nephrolithiasis, asymptomatic at this time.  Overdue on screening for colonoscopy, up-to-date on well woman, mammogram through OB/GYN office.  Benign findings.    REVIEW OF SYSTEMS     Constitutional neg except per HPI   Resp neg  CV neg    PHYSICAL EXAMINATION     Physical Exam  Constitutional  No distress  Cardiovascular Rate  normal . Rhythm: regular . Heart sounds:  normal  Pulmonary/Chest  Effort normal. Breath sounds:  normal  Psychiatric  Alert. Judgment and thought content normal. Mood normal     REVIEWED DATA     Labs:   Lab Results   Component Value Date    GLUCOSE 88 10/26/2021    BUN 11 10/26/2021    CREATININE 0.74 10/26/2021    EGFRIFNONA 95 10/26/2021    EGFRIFAFRI 109 10/26/2021    BCR 15 10/26/2021    K 4.8 10/26/2021    CO2 20 10/26/2021    CALCIUM 9.1 10/26/2021    PROTENTOTREF 6.6 10/26/2021    ALBUMIN 3.9 10/26/2021    LABIL2 1.4 10/26/2021    AST 20 10/26/2021    ALT 12 10/26/2021     Lab Results   Component Value Date    CHLPL 195 10/26/2021    TRIG 148 10/26/2021    HDL 61 10/26/2021     (H) 10/26/2021     Lab Results   Component Value Date    WBC 6.9 10/26/2021    HGB 13.1 10/26/2021    HCT 39.6 10/26/2021    MCV 97 10/26/2021     10/26/2021     Lab Results   Component Value Date    TSH 2.120 10/26/2021     Lab Results   Component Value Date    HGBA1C 4.60 (L) 10/16/2019         Imaging:           Medical Tests:              Summary of old records / correspondence / consultant report:           Request outside records:           *Examiner was wearing medical surgical mask, face shield and exam gloves during the entire duration of the visit. Patient was masked the entire time.   Minimum social distance of 6 ft maintained entire visit except if physical contact was necessary as documented.     Dictated utilizing Dragon dictation

## 2022-08-17 ENCOUNTER — PATIENT ROUNDING (BHMG ONLY) (OUTPATIENT)
Dept: INTERNAL MEDICINE | Age: 52
End: 2022-08-17

## 2022-08-17 NOTE — PROGRESS NOTES
A My-Chart message has been [place] to the [place] [facility] [place]

## 2022-10-07 ENCOUNTER — PREP FOR SURGERY (OUTPATIENT)
Dept: OTHER | Facility: HOSPITAL | Age: 52
End: 2022-10-07

## 2022-10-07 DIAGNOSIS — Z12.11 SCREENING FOR COLON CANCER: Primary | ICD-10-CM

## 2022-10-10 PROBLEM — Z12.11 SCREENING FOR COLON CANCER: Status: ACTIVE | Noted: 2022-10-10

## 2022-11-07 DIAGNOSIS — Z00.00 HEALTHCARE MAINTENANCE: Primary | ICD-10-CM

## 2022-12-12 ENCOUNTER — OFFICE VISIT (OUTPATIENT)
Dept: INTERNAL MEDICINE | Age: 52
End: 2022-12-12

## 2022-12-12 VITALS
HEIGHT: 63 IN | WEIGHT: 104.6 LBS | TEMPERATURE: 97.1 F | OXYGEN SATURATION: 100 % | DIASTOLIC BLOOD PRESSURE: 70 MMHG | HEART RATE: 80 BPM | BODY MASS INDEX: 18.54 KG/M2 | SYSTOLIC BLOOD PRESSURE: 132 MMHG

## 2022-12-12 DIAGNOSIS — Z00.00 ENCOUNTER FOR ANNUAL PHYSICAL EXAM: Primary | ICD-10-CM

## 2022-12-12 PROCEDURE — 90714 TD VACC NO PRESV 7 YRS+ IM: CPT | Performed by: NURSE PRACTITIONER

## 2022-12-12 PROCEDURE — 99396 PREV VISIT EST AGE 40-64: CPT | Performed by: NURSE PRACTITIONER

## 2022-12-12 PROCEDURE — 90471 IMMUNIZATION ADMIN: CPT | Performed by: NURSE PRACTITIONER

## 2022-12-12 NOTE — PROGRESS NOTES
"[facility]  Rolando Ulrich, CHENG Sharif Brendon / 52 y.o. / female  12/12/2022      VITALS     Visit Vitals  /70   Pulse 80   Temp 97.1 °F (36.2 °C) (Temporal)   Ht 160 cm (62.99\")   Wt 47.4 kg (104 lb 9.6 oz)   LMP  (LMP Unknown)   SpO2 100%   BMI 18.53 kg/m²       BP Readings from Last 3 Encounters:   12/12/22 132/70   08/15/22 128/80   11/04/21 116/78     Wt Readings from Last 3 Encounters:   12/12/22 47.4 kg (104 lb 9.6 oz)   08/15/22 49 kg (108 lb)   11/04/21 47.6 kg (105 lb)      Body mass index is 18.53 kg/m².    MEDICATIONS     Current Outpatient Medications   Medication Sig Dispense Refill   • cetirizine (zyrTEC) 10 MG tablet Take 10 mg by mouth As Needed.     • Chlorcyclizine-Pseudoephed (STAHIST AD PO) Take  by mouth Daily.     • cholecalciferol (VITAMIN D3) 1000 units tablet Take 1,000 Units by mouth Daily.     • ELDERBERRY PO Take  by mouth.     • fluticasone (FLONASE) 50 MCG/ACT nasal spray      • FOLIC ACID PO Take  by mouth.     • montelukast (SINGULAIR) 10 MG tablet Take 10 mg by mouth Daily.  9   • NON FORMULARY AMBEREN     • vitamin C (ASCORBIC ACID) 500 MG tablet Take 500 mg by mouth Daily.       No current facility-administered medications for this visit.       _____________________________________________________________________________________    CHIEF COMPLAINT     Annual Exam      HISTORY OF PRESENT ILLNESS     Chante presents for annual health maintenance visit.    · Last health maintenance visit: approximately 1 year ago  · General health: good  · Lifestyle:  · Attempting to lose weight?: No; maintaining   · Diet: eats moderately healthy  · Exercise: has not been as active recently  · Tobacco: Never used   · Alcohol: 7 days/week and 1 drink/occasion  · Work: Full-time  · Reproductive health:  · Sexually active?: Yes   · Sexual problems?: No problems  · Concern for STD?: No    · Sees Gynecologist?: Yes   · Corry/Postmenopausal?: Yes   · Domestic abuse concerns: No "   · Depression Screening:      PHQ-2/PHQ-9 Depression Screening 12/12/2022   Retired PHQ-9 Total Score -   Retired Total Score -   Little Interest or Pleasure in Doing Things 0-->not at all   Feeling Down, Depressed or Hopeless 0-->not at all   PHQ-9: Brief Depression Severity Measure Score 0         PHQ-2: 0 (Not depressed)   PHQ-9: 0 (Negative screening for depression)    Patient Care Team:  Rolando Ulrich APRN as PCP - General (Internal Medicine)  Devaughn Obrien MD as Consulting Physician (Urology)  Orville Cuellar MD as Consulting Physician (Dermatology)  Lissett Pérez MD as Consulting Physician (Obstetrics)  ______________________________________________________________________    ALLERGIES  No Known Allergies     PFSH:     The following portions of the patient's history were reviewed and updated as appropriate: Allergies / Current Medications / Past Medical History / Surgical History / Social History / Family History    PROBLEM LIST   Patient Active Problem List   Diagnosis   • Screening for colon cancer       PAST MEDICAL HISTORY  Past Medical History:   Diagnosis Date   • Allergic    • Kidney stone        SURGICAL HISTORY  Past Surgical History:   Procedure Laterality Date   • BREAST IMPLANT SURGERY  07/10/2020   • SUBTOTAL HYSTERECTOMY N/A 11/2015   • TONSILLECTOMY         SOCIAL HISTORY  Social History     Socioeconomic History   • Marital status:    • Number of children: 0   Tobacco Use   • Smoking status: Never   • Smokeless tobacco: Never   Substance and Sexual Activity   • Alcohol use: Yes     Comment: 1 glasses every night; caffeine use   • Drug use: No   • Sexual activity: Yes     Partners: Male       FAMILY HISTORY  Family History   Problem Relation Age of Onset   • Hypertension Mother    • Hypertension Father    • Lung cancer Maternal Grandmother    • Prostate cancer Maternal Grandfather    • Cancer Maternal Grandfather    • Cancer Paternal Grandmother    • Heart disease  Paternal Grandfather        IMMUNIZATION HISTORY  Immunization History   Administered Date(s) Administered   • COVID-19 (PFIZER) BIVALENT BOOSTER 12+YRS 10/03/2022   • COVID-19 (PFIZER) PURPLE CAP 03/10/2021, 03/31/2021, 10/18/2021   • FluLaval/Fluzone >6mos 09/19/2022   • FluMist 2-49yrs 10/28/2016, 10/30/2017   • Flucelvax Quad Vial =>4yrs 09/28/2022   • Fluzone Quad >6mos (Multi-dose) 10/30/2017   • Hepatitis A 03/01/2018, 09/01/2018   • Influenza, Unspecified 09/28/2018, 09/28/2018   • Shingrix 11/04/2021, 01/24/2022   • Td, Not Adsorbed 12/12/2022   • Tdap 11/01/2012       ______________________________________________________________________    REVIEW OF SYSTEMS     Review of Systems  Constitutional: Negative for activity change, appetite change, fatigue, fever and unexpected weight change.   HENT: Negative for congestion, ear pain, sore throat and trouble swallowing.    Eyes: Negative for visual disturbance.   Respiratory: Negative for shortness of breath.    Cardiovascular: Negative for chest pain and leg swelling.   Gastrointestinal: Negative for abdominal pain, blood in stool, constipation, diarrhea, nausea and vomiting.   Endocrine: Negative for polydipsia, polyphagia and polyuria.   Genitourinary: Negative for dysuria, pelvic pain, vaginal bleeding and vaginal discharge.   Musculoskeletal: Intermittent back pain   Neurological: Negative for dizziness, weakness, numbness and headaches. Intermittent tingling posterior thigh left   Hematological: Negative for adenopathy.   Psychiatric/Behavioral: Negative for confusion. The patient is not nervous/anxious.       PHYSICAL EXAMINATION     Physical Exam  Constitutional:       Appearance: Normal appearance. She is normal weight.   HENT:      Head: Normocephalic and atraumatic.      Right Ear: Tympanic membrane normal.      Left Ear: Tympanic membrane normal.      Nose: Nose normal. No congestion.      Mouth/Throat:      Mouth: Mucous membranes are moist.       Pharynx: Oropharynx is clear.   Eyes:      Conjunctiva/sclera: Conjunctivae normal.      Pupils: Pupils are equal, round, and reactive to light.   Neck:      Vascular: No carotid bruit.   Cardiovascular:      Rate and Rhythm: Normal rate and regular rhythm.      Pulses: Normal pulses.      Heart sounds: Normal heart sounds.   Pulmonary:      Effort: Pulmonary effort is normal.      Breath sounds: Normal breath sounds.   Abdominal:      General: There is no distension.      Palpations: Abdomen is soft.      Tenderness: There is no abdominal tenderness. There is no right CVA tenderness, left CVA tenderness or guarding.   Genitourinary:     Comments: Followed by OB/GYN  Musculoskeletal:         General: No swelling, tenderness or signs of injury. Normal range of motion.      Cervical back: Normal range of motion.      Right lower leg: No edema.      Left lower leg: No edema.   Lymphadenopathy:      Cervical: No cervical adenopathy.   Skin:     General: Skin is warm and dry.   Neurological:      Mental Status: She is alert and oriented to person, place, and time.      Cranial Nerves: No cranial nerve deficit.      Motor: No weakness.      Coordination: Coordination normal.      Gait: Gait normal.   Psychiatric:         Mood and Affect: Mood normal.         Behavior: Behavior normal.         Thought Content: Thought content normal.         Judgment: Judgment normal.        REVIEWED DATA      Labs:    Lab Results   Component Value Date     12/06/2022    K 4.5 12/06/2022    CALCIUM 9.5 12/06/2022    AST 17 12/06/2022    ALT 13 12/06/2022    BUN 16 12/06/2022    CREATININE 0.64 12/06/2022    CREATININE 0.74 10/26/2021    CREATININE 0.65 10/26/2020    EGFRIFNONA 95 10/26/2021    EGFRIFAFRI 109 10/26/2021       Lab Results   Component Value Date    HGBA1C 5.40 12/06/2022    HGBA1C 4.60 (L) 10/16/2019    TSH 2.800 12/06/2022    FREET4 1.18 12/06/2022       Lab Results   Component Value Date     (H) 12/06/2022     HDL 83 (H) 12/06/2022    TRIG 52 12/06/2022    CHOLHDLRATIO 2.81 12/06/2022       Lab Results   Component Value Date    UXBR43CI 56.0 12/06/2022        Lab Results   Component Value Date    WBC 5.04 12/06/2022    HGB 13.5 12/06/2022    MCV 93.9 12/06/2022     12/06/2022       Lab Results   Component Value Date    PROTEIN Negative 12/06/2022    GLUCOSEU Negative 12/06/2022    BLOODU Negative 12/06/2022    NITRITEU Negative 12/06/2022    LEUKOCYTESUR See below: (A) 12/06/2022          Lab Results   Component Value Date    HEPCVIRUSABY <0.1 12/06/2022       Imaging:        Medical Tests:        ASSESSMENT & PLAN     ANNUAL WELLNESS EXAM / PHYSICAL     Other medical problems addressed today:  Annual fasting lab did show modest recent LDL in the 140s.  Hemoglobin A1c at 5.4.  She has switched from red wine to white wine in which she drinks 1 to 2 glasses nightly.  She has not been exercising as frequent as usual.    Followed by the OB/GYN for well woman in which last mammogram was benign in August.  Performing pelvic exams.  Postmenopausal    Colonoscopy is scheduled for January.    Up-to-date on all vaccinations    Summary/Discussion:     Decrease/eliminate soda, caffeine, alcohol and overall caloric intake. Reduce carbohydrates and sweets in diet.  Continue to improve dietary habits with lean proteins, fresh vegetables, fruits, and nuts. Improve aerobic exercise: walking/biking/swimming daily as tolerated, recommend 30 minutes/day at least 5 days/week.  Continue management with OB/GYN  Schedule colonoscopy in January  Follow-up in 1 year for annual physical, earlier if needed    Next Appointment with me: Visit date not found    Return in about 1 year (around 12/12/2023) for Annual physical (may combine 2 15 see once yearly) .      HEALTHCARE MAINTENANCE ISSUES     Cancer Screening:  · Colon: Initial/Next screening at age: DUE NOW; scheduled 01/19/2023  · Repeat colon cancer screening: N/A at this time  · Breast:  Recommended monthly self exams; annual professional exam  · Mammogram: every 1 year  · Cervical: completed last year considering pelvic exams only   · Skin: Monthly self skin examination, annual exam by health professional  · Lung: Does not meet criteria for lung cancer screening.   · Other:    Screening Labs & Tests:  · Lab results reviewed & discussed with the patient or test orders placed today.  · EKG:  · CV Screening: Lipid panel  · DEXA (65+ or postmenopausal with risk factors):   · HEP C (If born 1441-5882, or risk factors): Negative screen  · Other:     Immunization/Vaccinations (to be given today unless deferred by patient)  · Influenza: Patient had the flu shot this season  · Hepatitis A: Up to date  · Tetanus/Pertussis: Administer today  · Pneumovax: Not needed at this time  · Shingles: Up to date  · Other: COVID-19 UTD    Lifestyle Counseling:   · Lifestyle Modifications: Continue good lifestyle choices/modifications, Improve dietary compliance and Continue to abstain/curtail drinking  · Safety Issues: Always wear seatbelt, Avoid texting while driving   · Use sunscreen, regular skin examination  · Recommended annual dental/vision examination.  · Emotional/Stress/Sleep: Reviewed and  given when appropriate      Health Maintenance   Topic Date Due   • COLORECTAL CANCER SCREENING  01/19/2023 (Originally 1970)   • PAP SMEAR  09/01/2023   • ANNUAL PHYSICAL  12/13/2023   • MAMMOGRAM  08/03/2024   • TDAP/TD VACCINES (4 - Td or Tdap) 12/12/2032   • HEPATITIS C SCREENING  Completed   • COVID-19 Vaccine  Completed   • INFLUENZA VACCINE  Completed   • ZOSTER VACCINE  Completed   • Pneumococcal Vaccine 0-64  Aged Out         Examiner was wearing surgical mask.     **Dragon dictation used for documentation.

## 2023-02-16 ENCOUNTER — ANESTHESIA EVENT (OUTPATIENT)
Dept: GASTROENTEROLOGY | Facility: HOSPITAL | Age: 53
End: 2023-02-16
Payer: COMMERCIAL

## 2023-02-16 ENCOUNTER — HOSPITAL ENCOUNTER (OUTPATIENT)
Facility: HOSPITAL | Age: 53
Setting detail: HOSPITAL OUTPATIENT SURGERY
Discharge: HOME | End: 2023-02-16
Attending: SURGERY | Admitting: SURGERY
Payer: COMMERCIAL

## 2023-02-16 ENCOUNTER — ANESTHESIA (OUTPATIENT)
Dept: GASTROENTEROLOGY | Facility: HOSPITAL | Age: 53
End: 2023-02-16
Payer: COMMERCIAL

## 2023-02-16 VITALS
RESPIRATION RATE: 14 BRPM | DIASTOLIC BLOOD PRESSURE: 95 MMHG | OXYGEN SATURATION: 100 % | HEART RATE: 64 BPM | HEIGHT: 63 IN | SYSTOLIC BLOOD PRESSURE: 121 MMHG | BODY MASS INDEX: 18.07 KG/M2 | WEIGHT: 102 LBS

## 2023-02-16 DIAGNOSIS — Z12.11 SCREENING FOR COLON CANCER: ICD-10-CM

## 2023-02-16 PROBLEM — K63.5 COLON POLYPS: Status: ACTIVE | Noted: 2023-02-16

## 2023-02-16 PROBLEM — K64.8 INTERNAL HEMORRHOIDS: Status: ACTIVE | Noted: 2023-02-16

## 2023-02-16 PROCEDURE — 45380 COLONOSCOPY AND BIOPSY: CPT | Performed by: SURGERY

## 2023-02-16 PROCEDURE — S0260 H&P FOR SURGERY: HCPCS | Performed by: SURGERY

## 2023-02-16 PROCEDURE — 88305 TISSUE EXAM BY PATHOLOGIST: CPT | Performed by: SURGERY

## 2023-02-16 PROCEDURE — 25010000002 ONDANSETRON PER 1 MG: Performed by: ANESTHESIOLOGY

## 2023-02-16 PROCEDURE — 25010000002 PROPOFOL 10 MG/ML EMULSION: Performed by: NURSE ANESTHETIST, CERTIFIED REGISTERED

## 2023-02-16 RX ORDER — ONDANSETRON 2 MG/ML
4 INJECTION INTRAMUSCULAR; INTRAVENOUS ONCE
Status: COMPLETED | OUTPATIENT
Start: 2023-02-16 | End: 2023-02-16

## 2023-02-16 RX ORDER — GUAIFENESIN 600 MG/1
1200 TABLET, EXTENDED RELEASE ORAL 2 TIMES DAILY
COMMUNITY

## 2023-02-16 RX ORDER — LORATADINE 10 MG/1
CAPSULE, LIQUID FILLED ORAL
COMMUNITY

## 2023-02-16 RX ORDER — PROPOFOL 10 MG/ML
VIAL (ML) INTRAVENOUS CONTINUOUS PRN
Status: DISCONTINUED | OUTPATIENT
Start: 2023-02-16 | End: 2023-02-16 | Stop reason: SURG

## 2023-02-16 RX ORDER — SACCHAROMYCES BOULARDII 250 MG
250 CAPSULE ORAL 2 TIMES DAILY
COMMUNITY

## 2023-02-16 RX ORDER — LIDOCAINE HYDROCHLORIDE 20 MG/ML
INJECTION, SOLUTION INFILTRATION; PERINEURAL AS NEEDED
Status: DISCONTINUED | OUTPATIENT
Start: 2023-02-16 | End: 2023-02-16 | Stop reason: SURG

## 2023-02-16 RX ORDER — PROPOFOL 10 MG/ML
VIAL (ML) INTRAVENOUS AS NEEDED
Status: DISCONTINUED | OUTPATIENT
Start: 2023-02-16 | End: 2023-02-16 | Stop reason: SURG

## 2023-02-16 RX ORDER — SODIUM CHLORIDE, SODIUM LACTATE, POTASSIUM CHLORIDE, CALCIUM CHLORIDE 600; 310; 30; 20 MG/100ML; MG/100ML; MG/100ML; MG/100ML
30 INJECTION, SOLUTION INTRAVENOUS CONTINUOUS PRN
Status: DISCONTINUED | OUTPATIENT
Start: 2023-02-16 | End: 2023-02-16 | Stop reason: HOSPADM

## 2023-02-16 RX ADMIN — Medication 100 MG: at 08:57

## 2023-02-16 RX ADMIN — PROPOFOL 180 MCG/KG/MIN: 10 INJECTION, EMULSION INTRAVENOUS at 08:58

## 2023-02-16 RX ADMIN — LIDOCAINE HYDROCHLORIDE 60 MG: 20 INJECTION, SOLUTION INFILTRATION; PERINEURAL at 08:58

## 2023-02-16 RX ADMIN — ONDANSETRON 4 MG: 2 INJECTION INTRAMUSCULAR; INTRAVENOUS at 08:46

## 2023-02-16 RX ADMIN — SODIUM CHLORIDE, POTASSIUM CHLORIDE, SODIUM LACTATE AND CALCIUM CHLORIDE 30 ML/HR: 600; 310; 30; 20 INJECTION, SOLUTION INTRAVENOUS at 08:46

## 2023-02-16 NOTE — ANESTHESIA POSTPROCEDURE EVALUATION
"Patient: Chante Olivas    Procedure Summary     Date: 02/16/23 Room / [facility]:  [facility]    Anesthesia Start: 0853 Anesthesia Stop: 0923    Procedure: COLONOSCOPY TO CECUM AND TI WITH COLD BIOPSY POLYPECTOMY Diagnosis:       Screening for colon cancer      (Screening for colon cancer [Z12.11])    Surgeons: Isabel Malcolm MD Provider: Constantine Wheat MD    Anesthesia Type: MAC ASA Status: 1          Anesthesia Type: MAC    Vitals  Vitals Value Taken Time   /99 02/16/23 0938   Temp     Pulse 79 02/16/23 0938   Resp 14 02/16/23 0938   SpO2 100 % 02/16/23 0938           Post Anesthesia Care and Evaluation    Patient location during evaluation: bedside  Patient participation: complete - patient participated  Level of consciousness: awake and alert  Pain management: adequate    Airway patency: patent  Anesthetic complications: No anesthetic complications    Cardiovascular status: acceptable  Respiratory status: acceptable  Hydration status: acceptable    Comments: /99 (BP Location: Left arm, Patient Position: Lying)   Pulse 79   Resp 14   Ht 160 cm (63\")   Wt 46.3 kg (102 lb)   SpO2 100%   BMI 18.07 kg/m²       "

## 2023-02-16 NOTE — H&P
General Surgery  History and Physical    CC: Screening for colon cancer    HPI: The patient is a pleasant 52 y.o. year-old lady who presents today for a routine screening colonoscopy.  She denies any melena or hematochezia and has no family history of colon cancer.  She has never previously undergone colonoscopic evaluation.    Past Medical History:   History of kidney stones    Past Surgical History:   Hysterectomy  Bilateral breast augmentation  Tonsillectomy    Medications:   Medications Prior to Admission   Medication Sig Dispense Refill Last Dose   • cholecalciferol (VITAMIN D3) 1000 units tablet Take 1,000 Units by mouth Daily.   2/15/2023   • ELDERBERRY PO Take  by mouth.   2/15/2023   • fluticasone (FLONASE) 50 MCG/ACT nasal spray    2/15/2023   • guaiFENesin (MUCINEX) 600 MG 12 hr tablet Take 1,200 mg by mouth 2 (Two) Times a Day.   Past Week   • Loratadine (Claritin) 10 MG capsule Take  by mouth.   Past Week   • NON FORMULARY AMBEREN   2/15/2023   • saccharomyces boulardii (FLORASTOR) 250 MG capsule Take 250 mg by mouth 2 (Two) Times a Day.   2/15/2023   • vitamin C (ASCORBIC ACID) 500 MG tablet Take 500 mg by mouth Daily.   2/15/2023   • cetirizine (zyrTEC) 10 MG tablet Take 10 mg by mouth As Needed.   More than a month   • Chlorcyclizine-Pseudoephed (STAHIST AD PO) Take  by mouth Daily.   More than a month   • FOLIC ACID PO Take  by mouth.      • montelukast (SINGULAIR) 10 MG tablet Take 10 mg by mouth Daily.  9 More than a month       Allergies: No known drug allergies    Family History: Maternal grandmother with history of lung cancer, maternal grandfather with history of prostate cancer    Social History: , non-smoker, drinks 1 glass of alcohol nightly    ROS: A comprehensive review of systems was conducted and negative for melena or hematochezia  All other systems reviewed and negative    Physical Exam:  Vitals:    02/16/23 0834   BP: (!) 157/104   Pulse: 80   Resp: 14   SpO2: 99%     Height:  160 cm  Weight: 46 kg  BMI: 18  General: No acute distress, well-nourished & well-developed  HEAD: normocephalic, atraumatic  EYES: normal conjunctiva, sclera anicteric  EARS: grossly normal hearing  NECK: supple, no thyromegaly  CARDIOVASCULAR: regular rate and rhythm  RESPIRATORY: clear to auscultation bilaterally  GASTROINTESTINAL: soft, nontender, non-distended  PSYCHIATRIC: oriented x3, normal mood and affect    ASSESSMENT & PLAN  Ms. Olivas is a 52-year-old lady here for a routine screening colonoscopy.  She has been counseled on the risks of the procedure to include bleeding, colon perforation, and missed pathology.  Despite these risks, she has consented to proceed.    Isabel Malcolm MD  General, Robotic, and Endoscopic Surgery  [facility]    [address], [address]  [place] [address]  P: [phone]  F: [phone]

## 2023-02-16 NOTE — OP NOTE
Operative Note :  Isabel Malcolm MD      Chante Brendon  1970    Procedure Date: 02/16/23    Pre-op Diagnosis:  Screening for colon cancer [Z12.11]    Post-Operative Diagnosis:  Colon polyp  Internal hemorrhoids    Procedure:   Flexible colonoscopy to the cecum with cold forcep polypectomy    Surgeon: Isabel Malcolm MD    Assistant: None    Anesthesia:  MAC (monitored anesthetic care)    Estimated Blood Loss: Minimal    Specimens: Descending colon polyp    Complications: None    Indications:  Ms. Olivas is a 52-year-old lady here for a routine screening colonoscopy.  She has been counseled on the risks of the procedure to include bleeding, colon perforation, and missed pathology.  Despite these risks, she has consented to proceed.    Findings: Prolapsing grade 2 internal hemorrhoids, 2 mm descending colon polyp    Description of procedure:  The patient was brought to the endoscopy suite and elena in the left lateral decubitus position.  Continuous propofol anesthesia was administered.  A surgical timeout was completed.  A digital rectal exam was performed, revealing prolapsing grade 2 internal hemorrhoids.  An adult colonoscope was then inserted through the anus and passed under direct visualization to the level of the cecum.  The cecum was identified via the ileocecal valve as well as the appendiceal orifice.  The scope was then slowly withdrawn, examining all circumferential walls of the ascending, transverse, descending, and sigmoid colon.  There was a 2 mm polyp of the descending colon removed using the biopsy forceps.  Within the rectum the scope was retroflexed and showed circumferential grade 2 internal hemorrhoids.  The scope was then withdrawn and the colon desufflated.  The patient had a very good bowel prep and was transferred to the recovery area in stable condition.     Recommendations:  I will call the patient in 1 week or less with the pathology results of the one polyp removed as this  will determine when the next screening colonoscopy will be due.    Isabel Malcolm MD  General, Robotic, and Endoscopic Surgery  [facility]    [address], [address]  [place] [address]  P: [phone]  F: [phone]

## 2023-02-16 NOTE — ANESTHESIA PREPROCEDURE EVALUATION
Anesthesia Evaluation     Patient summary reviewed and Nursing notes reviewed   no history of anesthetic complications:  NPO Solid Status: > 8 hours  NPO Liquid Status: > 8 hours           Airway   Mallampati: II  Dental      Pulmonary - negative pulmonary ROS and normal exam   Cardiovascular - negative cardio ROS and normal exam        Neuro/Psych- negative ROS  GI/Hepatic/Renal/Endo    (+)   renal disease stones,     Musculoskeletal     Abdominal    Substance History      OB/GYN          Other                        Anesthesia Plan    ASA 1     MAC     intravenous induction     Anesthetic plan, risks, benefits, and alternatives have been provided, discussed and informed consent has been obtained with: patient.        CODE STATUS:

## 2023-02-17 LAB
LAB AP CASE REPORT: NORMAL
PATH REPORT.FINAL DX SPEC: NORMAL
PATH REPORT.GROSS SPEC: NORMAL

## 2023-02-22 ENCOUNTER — TELEPHONE (OUTPATIENT)
Dept: SURGERY | Facility: CLINIC | Age: 53
End: 2023-02-22
Payer: COMMERCIAL

## 2023-02-22 NOTE — TELEPHONE ENCOUNTER
I called Chante and discussed the benign pathology findings of a single tubular adenoma polyp removed during her colonoscopy last week.  I would recommend she have a repeat screening colonoscopy in 5 years.  She expressed understanding.    Luda, can you update her health maintenance tab and recall pool to reflect a 5-year interval?    Thanks,  KESHIA

## 2023-12-14 ENCOUNTER — OFFICE VISIT (OUTPATIENT)
Dept: INTERNAL MEDICINE | Age: 53
End: 2023-12-14

## 2023-12-14 VITALS
DIASTOLIC BLOOD PRESSURE: 72 MMHG | TEMPERATURE: 98 F | BODY MASS INDEX: 19.74 KG/M2 | OXYGEN SATURATION: 98 % | WEIGHT: 111.4 LBS | HEIGHT: 63 IN | HEART RATE: 96 BPM | SYSTOLIC BLOOD PRESSURE: 118 MMHG

## 2023-12-14 DIAGNOSIS — Z00.00 ENCOUNTER FOR ANNUAL PHYSICAL EXAM: Primary | ICD-10-CM

## 2023-12-14 PROBLEM — Z98.890 HISTORY OF ENDOMETRIAL ABLATION: Status: ACTIVE | Noted: 2023-05-06

## 2023-12-14 PROBLEM — N80.9 ENDOMETRIOSIS: Status: ACTIVE | Noted: 2023-05-06

## 2023-12-14 PROBLEM — Z98.51 HX OF TUBAL LIGATION: Status: ACTIVE | Noted: 2023-05-06

## 2023-12-14 PROBLEM — Z78.0 MENOPAUSE: Status: ACTIVE | Noted: 2023-05-06

## 2023-12-14 PROCEDURE — 99396 PREV VISIT EST AGE 40-64: CPT | Performed by: NURSE PRACTITIONER

## 2023-12-14 RX ORDER — PAROXETINE 10 MG/1
10 TABLET, FILM COATED ORAL EVERY MORNING
COMMUNITY

## 2023-12-14 NOTE — PROGRESS NOTES
"[facility]  CHENG Jeronimo Brendon / 53 y.o. / female  12/14/2023      VITALS     Visit Vitals  /72 (BP Location: Left arm, Patient Position: Sitting, Cuff Size: Adult)   Pulse 96   Temp 98 °F (36.7 °C) (Temporal)   Ht 160 cm (63\")   Wt 50.5 kg (111 lb 6.4 oz)   SpO2 98%   BMI 19.73 kg/m²       BP Readings from Last 3 Encounters:   12/14/23 118/72   02/16/23 121/95   12/12/22 132/70     Wt Readings from Last 3 Encounters:   12/14/23 50.5 kg (111 lb 6.4 oz)   02/16/23 46.3 kg (102 lb)   12/12/22 47.4 kg (104 lb 9.6 oz)      Body mass index is 19.73 kg/m².    MEDICATIONS     Current Outpatient Medications   Medication Sig Dispense Refill    cetirizine (zyrTEC) 10 MG tablet Take 1 tablet by mouth As Needed.      Chlorcyclizine-Pseudoephed (STAHIST AD PO) Take  by mouth Daily.      cholecalciferol (VITAMIN D3) 1000 units tablet Take 1 tablet by mouth Daily.      ELDERBERRY PO Take  by mouth.      fluticasone (FLONASE) 50 MCG/ACT nasal spray       FOLIC ACID PO Take  by mouth.      guaiFENesin (MUCINEX) 600 MG 12 hr tablet Take 2 tablets by mouth 2 (Two) Times a Day.      Loratadine (Claritin) 10 MG capsule Take  by mouth.      montelukast (SINGULAIR) 10 MG tablet Take 1 tablet by mouth Daily.  9    PARoxetine (PAXIL) 10 MG tablet Take 1 tablet by mouth Every Morning.      vitamin C (ASCORBIC ACID) 500 MG tablet Take 1 tablet by mouth Daily.      saccharomyces boulardii (FLORASTOR) 250 MG capsule Take 250 mg by mouth 2 (Two) Times a Day. (Patient not taking: Reported on 12/14/2023)       No current facility-administered medications for this visit.       _____________________________________________________________________________________    CHIEF COMPLAINT     Annual Exam      HISTORY OF PRESENT ILLNESS     Chante presents for annual health maintenance visit.    Last health maintenance visit: approximately 1 year ago  General health: excellent  Lifestyle:  Attempting to lose weight?: No "   Diet: eats moderately healthy  Exercise: exercises 3-5 days weekly  Tobacco: Never used   Alcohol: occasional/infrequent  Work: Full-time  Reproductive health:  Sexually active?: Yes   Sexual problems?: No problems  Concern for STD?: No    Sees Gynecologist?: Yes   Corry/Postmenopausal?: Yes   Domestic abuse concerns: No   Depression Screenin/14/2023    11:14 AM   PHQ-2/PHQ-9 Depression Screening   Little Interest or Pleasure in Doing Things 0-->not at all   Feeling Down, Depressed or Hopeless 0-->not at all   PHQ-9: Brief Depression Severity Measure Score 0         PHQ-2: 0 (Not depressed)   PHQ-9: 0 (Negative screening for depression)    Patient Care Team:  Rolando Ulrich APRN as PCP - General (Internal Medicine)  Devaughn Obrien MD as Consulting Physician (Urology)  Orville Cuellar MD as Consulting Physician (Dermatology)  Lissett Pérez MD as Consulting Physician (Obstetrics)  ______________________________________________________________________    ALLERGIES  No Known Allergies     PFSH:     The following portions of the patient's history were reviewed and updated as appropriate: Allergies / Current Medications / Past Medical History / Surgical History / Social History / Family History    PROBLEM LIST   Patient Active Problem List   Diagnosis    Screening for colon cancer    Colon polyps    Internal hemorrhoids    Endometriosis    History of endometrial ablation    Hx of tubal ligation    Menopause       PAST MEDICAL HISTORY  Past Medical History:   Diagnosis Date    Allergic     Asthma     Colon polyp 2023    Only had 1    Kidney stone     PONV (postoperative nausea and vomiting)        SURGICAL HISTORY  Past Surgical History:   Procedure Laterality Date    BREAST IMPLANT SURGERY  07/10/2020    COLONOSCOPY N/A 2023    Procedure: COLONOSCOPY TO CECUM AND TI WITH COLD BIOPSY POLYPECTOMY;  Surgeon: Isabel Malcolm MD;  Location: [facility] ENDOSCOPY;  Service: General;   Laterality: N/A;  pre: screening  post: polyp and internal hemorrhoids    SUBTOTAL HYSTERECTOMY N/A 11/2015    TONSILLECTOMY         SOCIAL HISTORY  Social History     Socioeconomic History    Marital status:     Number of children: 0   Tobacco Use    Smoking status: Never    Smokeless tobacco: Never   Vaping Use    Vaping Use: Never used   Substance and Sexual Activity    Alcohol use: Yes     Alcohol/week: 6.0 standard drinks of alcohol     Types: 4 Glasses of wine, 2 Drinks containing 0.5 oz of alcohol per week     Comment: 1 glasses every night; caffeine use    Drug use: No    Sexual activity: Yes     Partners: Male     Birth control/protection: Other       FAMILY HISTORY  Family History   Problem Relation Age of Onset    Hypertension Mother     Hypertension Father     Lung cancer Maternal Grandmother     Prostate cancer Maternal Grandfather     Cancer Maternal Grandfather     Cancer Paternal Grandmother     Heart disease Paternal Grandfather        IMMUNIZATION HISTORY  Immunization History   Administered Date(s) Administered    COVID-19 (PFIZER) BIVALENT 12+YRS 10/03/2022    COVID-19 (PFIZER) Purple Cap Monovalent 03/10/2021, 03/31/2021, 10/18/2021    COVID-19 F23 (PFIZER) 12YRS+ (COMIRNATY) 10/03/2023    FluMist 2-49yrs 10/28/2016, 10/30/2017    Flucelvax Quad Vial =>4yrs 09/28/2022    Fluzone (or Fluarix & Flulaval for VFC) >6mos 09/19/2022, 10/03/2023    Fluzone Quad >6mos (Multi-dose) 10/30/2017    Hepatitis A 03/01/2018, 09/01/2018    Influenza, Unspecified 09/28/2018, 09/28/2018    Shingrix 11/04/2021, 01/24/2022    Td, Not Adsorbed 12/12/2022    Tdap 11/01/2012       ______________________________________________________________________    REVIEW OF SYSTEMS     Review of Systems  Constitutional: Negative for activity change, appetite change, fatigue, fever and unexpected weight change.   HENT: Negative for congestion, ear pain, sore throat and trouble swallowing.    Eyes: Negative for visual  disturbance.   Respiratory: Negative for shortness of breath.    Cardiovascular: Negative for chest pain and leg swelling.   Gastrointestinal: Negative for abdominal pain, blood in stool, constipation, diarrhea, nausea and vomiting.   Endocrine: Negative for polydipsia, polyphagia and polyuria.   Genitourinary: Negative for dysuria, pelvic pain, vaginal bleeding and vaginal discharge.   Musculoskeletal: Intermittent back pain   Neurological: Negative for dizziness, weakness, numbness and headaches. Intermittent tingling posterior thigh left   Hematological: Negative for adenopathy.   Psychiatric/Behavioral: Negative for confusion. The patient is not nervous/anxious.      PHYSICAL EXAMINATION     Physical Exam  Constitutional:       Appearance: Normal appearance. She is normal weight.   HENT:      Head: Normocephalic and atraumatic.      Right Ear: Tympanic membrane normal.      Left Ear: Tympanic membrane normal.      Nose: Nose normal. No congestion.      Mouth/Throat:      Mouth: Mucous membranes are moist.      Pharynx: Oropharynx is clear.   Eyes:      Conjunctiva/sclera: Conjunctivae normal.      Pupils: Pupils are equal, round, and reactive to light.   Neck:      Vascular: No carotid bruit.   Cardiovascular:      Rate and Rhythm: Normal rate and regular rhythm.      Pulses: Normal pulses.      Heart sounds: Normal heart sounds.   Pulmonary:      Effort: Pulmonary effort is normal.      Breath sounds: Normal breath sounds.   Abdominal:      General: There is no distension.      Palpations: Abdomen is soft.      Tenderness: There is no abdominal tenderness. There is no right CVA tenderness, left CVA tenderness or guarding.   Genitourinary:     Comments: Followed by OB/GYN  Musculoskeletal:         General: No swelling, tenderness or signs of injury. Normal range of motion.      Cervical back: Normal range of motion.      Right lower leg: No edema.      Left lower leg: No edema.   Lymphadenopathy:      Cervical:  No cervical adenopathy.   Skin:     General: Skin is warm and dry.   Neurological:      Mental Status: She is alert and oriented to person, place, and time.      Cranial Nerves: No cranial nerve deficit.      Motor: No weakness.      Coordination: Coordination normal.      Gait: Gait normal.   Psychiatric:         Mood and Affect: Mood normal.         Behavior: Behavior normal.         Thought Content: Thought content normal.         Judgment: Judgment normal.      REVIEWED DATA      Labs:    Lab Results   Component Value Date     12/06/2022    K 4.5 12/06/2022    CALCIUM 9.5 12/06/2022    AST 17 12/06/2022    ALT 13 12/06/2022    BUN 16 12/06/2022    CREATININE 0.64 12/06/2022    CREATININE 0.74 10/26/2021    CREATININE 0.65 10/26/2020    EGFRIFNONA 95 10/26/2021    EGFRIFAFRI 109 10/26/2021       Lab Results   Component Value Date    HGBA1C 5.40 12/06/2022    HGBA1C 4.60 (L) 10/16/2019    TSH 2.800 12/06/2022    FREET4 1.18 12/06/2022       Lab Results   Component Value Date     (H) 12/06/2022    HDL 83 (H) 12/06/2022    TRIG 52 12/06/2022    CHOLHDLRATIO 2.81 12/06/2022       Lab Results   Component Value Date    UNQO03PG 56.0 12/06/2022        Lab Results   Component Value Date    WBC 5.04 12/06/2022    HGB 13.5 12/06/2022    MCV 93.9 12/06/2022     12/06/2022       Lab Results   Component Value Date    PROTEIN Negative 12/06/2022    GLUCOSEU Negative 12/06/2022    BLOODU Negative 12/06/2022    NITRITEU Negative 12/06/2022    LEUKOCYTESUR See below: (A) 12/06/2022          Lab Results   Component Value Date    HEPCVIRUSABY <0.1 12/06/2022       Imaging:        Medical Tests:        ASSESSMENT & PLAN     ANNUAL WELLNESS EXAM / PHYSICAL     Other medical problems addressed today:      Summary/Discussion:     Up-to-date on vaccinations  Up-to-date on health maintenance  Update annual fasting labs today    Next Appointment with me: Visit date not found    Return in about 1 year (around  12/14/2024) for Annual physical.      HEALTHCARE MAINTENANCE ISSUES     Cancer Screening:  Colon: Initial/Next screening at age: CURRENT  Repeat colon cancer screening: every 5 years  Breast: Recommended monthly self exams; annual professional exam  Mammogram: every 1 year  Cervical: pelvic exam as recommended by gyne  Skin: Monthly self skin examination, annual exam by health professional  Lung: Does not meet criteria for lung cancer screening.   Other:    Screening Labs & Tests:  Lab results reviewed & discussed with the patient or test orders placed today.  EKG:  CV Screening: Lipid panel  DEXA (65+ or postmenopausal with risk factors):   HEP C (If born [birth year]15-9[birth year], or risk factors): Negative screen  Other:     Immunization/Vaccinations (to be given today unless deferred by patient)  Influenza: Patient had the flu shot this season  Hepatitis A: Up to date  Tetanus/Pertussis: Up to date  Pneumovax: Not needed at this time  Shingles: Up to date  Other:     Lifestyle Counseling:  Lifestyle Modifications: Continue good lifestyle choices/modifications  Safety Issues: Always wear seatbelt, Avoid texting while driving   Use sunscreen, regular skin examination  Recommended annual dental/vision examination.  Emotional/Stress/Sleep: Reviewed and  given when appropriate      Health Maintenance   Topic Date Due    PAP SMEAR  05/08/2024 (Originally 9/1/2023)    ANNUAL PHYSICAL  12/14/2024    MAMMOGRAM  08/14/2025    COLORECTAL CANCER SCREENING  02/16/2028    TDAP/TD VACCINES (4 - Td or Tdap) 12/12/2032    HEPATITIS C SCREENING  Completed    COVID-19 Vaccine  Completed    INFLUENZA VACCINE  Completed    ZOSTER VACCINE  Completed    Pneumococcal Vaccine 0-64  Aged Out     *Dragon dictation used for documentation.

## 2023-12-18 DIAGNOSIS — N39.0 URINARY TRACT INFECTION WITHOUT HEMATURIA, SITE UNSPECIFIED: Primary | ICD-10-CM

## 2023-12-18 LAB
ALBUMIN SERPL-MCNC: 4.9 G/DL (ref 3.5–5.2)
ALBUMIN/GLOB SERPL: 2 G/DL
ALP SERPL-CCNC: 83 U/L (ref 39–117)
ALT SERPL-CCNC: 12 U/L (ref 1–33)
APPEARANCE UR: ABNORMAL
AST SERPL-CCNC: 16 U/L (ref 1–32)
BACTERIA #/AREA URNS HPF: ABNORMAL /HPF
BACTERIA UR CULT: ABNORMAL
BACTERIA UR CULT: ABNORMAL
BASOPHILS # BLD AUTO: 0.07 10*3/MM3 (ref 0–0.2)
BASOPHILS NFR BLD AUTO: 1 % (ref 0–1.5)
BILIRUB SERPL-MCNC: 0.4 MG/DL (ref 0–1.2)
BILIRUB UR QL STRIP: NEGATIVE
BUN SERPL-MCNC: 16 MG/DL (ref 6–20)
BUN/CREAT SERPL: 24.6 (ref 7–25)
CALCIUM SERPL-MCNC: 10 MG/DL (ref 8.6–10.5)
CASTS URNS QL MICRO: ABNORMAL /LPF
CHLORIDE SERPL-SCNC: 102 MMOL/L (ref 98–107)
CHOLEST SERPL-MCNC: 243 MG/DL (ref 0–200)
CHOLEST/HDLC SERPL: 3.24 {RATIO}
CO2 SERPL-SCNC: 28.8 MMOL/L (ref 22–29)
COLOR UR: YELLOW
CREAT SERPL-MCNC: 0.65 MG/DL (ref 0.57–1)
EGFRCR SERPLBLD CKD-EPI 2021: 106.1 ML/MIN/1.73
EOSINOPHIL # BLD AUTO: 0.03 10*3/MM3 (ref 0–0.4)
EOSINOPHIL NFR BLD AUTO: 0.4 % (ref 0.3–6.2)
EPI CELLS #/AREA URNS HPF: ABNORMAL /HPF (ref 0–10)
ERYTHROCYTE [DISTWIDTH] IN BLOOD BY AUTOMATED COUNT: 12.3 % (ref 12.3–15.4)
GLOBULIN SER CALC-MCNC: 2.4 GM/DL
GLUCOSE SERPL-MCNC: 107 MG/DL (ref 65–99)
GLUCOSE UR QL STRIP: NEGATIVE
HBA1C MFR BLD: 5.6 % (ref 4.8–5.6)
HCT VFR BLD AUTO: 44.7 % (ref 34–46.6)
HDLC SERPL-MCNC: 75 MG/DL (ref 40–60)
HGB BLD-MCNC: 14.6 G/DL (ref 12–15.9)
HGB UR QL STRIP: NEGATIVE
IMM GRANULOCYTES # BLD AUTO: 0.01 10*3/MM3 (ref 0–0.05)
IMM GRANULOCYTES NFR BLD AUTO: 0.1 % (ref 0–0.5)
KETONES UR QL STRIP: NEGATIVE
LDLC SERPL CALC-MCNC: 153 MG/DL (ref 0–100)
LEUKOCYTE ESTERASE UR QL STRIP: ABNORMAL
LYMPHOCYTES # BLD AUTO: 1.92 10*3/MM3 (ref 0.7–3.1)
LYMPHOCYTES NFR BLD AUTO: 28 % (ref 19.6–45.3)
MCH RBC QN AUTO: 31.4 PG (ref 26.6–33)
MCHC RBC AUTO-ENTMCNC: 32.7 G/DL (ref 31.5–35.7)
MCV RBC AUTO: 96.1 FL (ref 79–97)
MICRO URNS: ABNORMAL
MONOCYTES # BLD AUTO: 0.84 10*3/MM3 (ref 0.1–0.9)
MONOCYTES NFR BLD AUTO: 12.2 % (ref 5–12)
NEUTROPHILS # BLD AUTO: 3.99 10*3/MM3 (ref 1.7–7)
NEUTROPHILS NFR BLD AUTO: 58.3 % (ref 42.7–76)
NITRITE UR QL STRIP: NEGATIVE
NRBC BLD AUTO-RTO: 0 /100 WBC (ref 0–0.2)
OTHER ANTIBIOTIC SUSC ISLT: ABNORMAL
PH UR STRIP: 7 [PH] (ref 5–7.5)
PLATELET # BLD AUTO: 298 10*3/MM3 (ref 140–450)
POTASSIUM SERPL-SCNC: 4.2 MMOL/L (ref 3.5–5.2)
PROT SERPL-MCNC: 7.3 G/DL (ref 6–8.5)
PROT UR QL STRIP: NEGATIVE
RBC # BLD AUTO: 4.65 10*6/MM3 (ref 3.77–5.28)
RBC #/AREA URNS HPF: ABNORMAL /HPF (ref 0–2)
SODIUM SERPL-SCNC: 142 MMOL/L (ref 136–145)
SP GR UR STRIP: 1.02 (ref 1–1.03)
T4 FREE SERPL-MCNC: 1.09 NG/DL (ref 0.93–1.7)
TRIGL SERPL-MCNC: 86 MG/DL (ref 0–150)
TSH SERPL DL<=0.005 MIU/L-ACNC: 1.67 UIU/ML (ref 0.27–4.2)
URINALYSIS REFLEX: ABNORMAL
UROBILINOGEN UR STRIP-MCNC: 0.2 MG/DL (ref 0.2–1)
VLDLC SERPL CALC-MCNC: 15 MG/DL (ref 5–40)
WBC # BLD AUTO: 6.86 10*3/MM3 (ref 3.4–10.8)
WBC #/AREA URNS HPF: ABNORMAL /HPF (ref 0–5)

## 2023-12-18 RX ORDER — NITROFURANTOIN 25; 75 MG/1; MG/1
100 CAPSULE ORAL 2 TIMES DAILY
Qty: 10 CAPSULE | Refills: 0 | Status: SHIPPED | OUTPATIENT
Start: 2023-12-18 | End: 2023-12-23

## 2024-11-22 ENCOUNTER — TELEPHONE (OUTPATIENT)
Dept: INTERNAL MEDICINE | Age: 54
End: 2024-11-22
Payer: COMMERCIAL

## 2024-11-22 DIAGNOSIS — E55.9 VITAMIN D DEFICIENCY: ICD-10-CM

## 2024-11-22 DIAGNOSIS — Z00.00 PREVENTATIVE HEALTH CARE: Primary | ICD-10-CM

## 2024-12-18 ENCOUNTER — OFFICE VISIT (OUTPATIENT)
Dept: INTERNAL MEDICINE | Age: 54
End: 2024-12-18
Payer: COMMERCIAL

## 2024-12-18 VITALS
TEMPERATURE: 96.7 F | WEIGHT: 115.6 LBS | HEIGHT: 63 IN | OXYGEN SATURATION: 97 % | BODY MASS INDEX: 20.48 KG/M2 | SYSTOLIC BLOOD PRESSURE: 138 MMHG | HEART RATE: 75 BPM | DIASTOLIC BLOOD PRESSURE: 88 MMHG

## 2024-12-18 DIAGNOSIS — Z00.00 ENCOUNTER FOR ANNUAL PHYSICAL EXAM: Primary | ICD-10-CM

## 2024-12-18 PROCEDURE — 99396 PREV VISIT EST AGE 40-64: CPT | Performed by: NURSE PRACTITIONER

## 2024-12-18 RX ORDER — PROGESTERONE 100 MG/1
100 CAPSULE ORAL NIGHTLY
COMMUNITY
Start: 2024-12-09

## 2024-12-18 RX ORDER — ESTRADIOL 0.05 MG/D
PATCH, EXTENDED RELEASE TRANSDERMAL
COMMUNITY
Start: 2024-09-30

## 2024-12-18 RX ORDER — ESTRADIOL 0.05 MG/D
1 PATCH, EXTENDED RELEASE TRANSDERMAL 2 TIMES WEEKLY
COMMUNITY
Start: 2024-09-26 | End: 2024-12-18 | Stop reason: SDUPTHER

## 2024-12-18 NOTE — PROGRESS NOTES
"                     S K Y L E R    F R Y E ,   D N P ,  A P R N                  I  N  T  E  R  N  A  L    M  E  D  I  C  I  N  E       ENCOUNTER DATE:  12/18/2024    Chante Brendon / 54 y.o. / female    ANNUAL PREVENTATIVE / PHYSICAL ENCOUNTER     CHIEF COMPLAINT     Annual Exam    VITALS     Vitals:    12/18/24 0932   BP: 138/88   Pulse: 75   Temp: 96.7 °F (35.9 °C)   SpO2: 97%   Weight: 52.4 kg (115 lb 9.6 oz)   Height: 160 cm (63\")       BP Readings from Last 3 Encounters:   12/18/24 138/88   12/14/23 118/72   02/16/23 121/95     Wt Readings from Last 3 Encounters:   12/18/24 52.4 kg (115 lb 9.6 oz)   12/14/23 50.5 kg (111 lb 6.4 oz)   02/16/23 46.3 kg (102 lb)      Body mass index is 20.48 kg/m².      MEDICATIONS     Current Outpatient Medications on File Prior to Visit   Medication Sig Dispense Refill    cetirizine (zyrTEC) 10 MG tablet Take 1 tablet by mouth As Needed.      cholecalciferol (VITAMIN D3) 1000 units tablet Take 1 tablet by mouth Daily.      ELDERBERRY PO Take  by mouth.      estradiol (MINIVELLE, VIVELLE-DOT) 0.05 MG/24HR patch APPLY 1 PATCH TWICE A WEEK      fluticasone (FLONASE) 50 MCG/ACT nasal spray Administer 1 spray into the nostril(s) as directed by provider As Needed.      guaiFENesin (MUCINEX) 600 MG 12 hr tablet Take 2 tablets by mouth As Needed.      Loratadine (Claritin) 10 MG capsule Take  by mouth As Needed.      PARoxetine (PAXIL) 10 MG tablet Take 1 tablet by mouth Every Morning.      Progesterone (PROMETRIUM) 100 MG capsule Take 1 capsule by mouth Every Night.      vitamin C (ASCORBIC ACID) 500 MG tablet Take 1 tablet by mouth Daily.      [DISCONTINUED] estradiol (MINIVELLE, VIVELLE-DOT) 0.05 MG/24HR patch Place 1 patch on the skin as directed by provider 2 (Two) Times a Week.      [DISCONTINUED] Chlorcyclizine-Pseudoephed (STAHIST AD PO) Take  by mouth Daily. (Patient not taking: Reported on 12/18/2024)      [DISCONTINUED] FOLIC ACID PO Take  by mouth. (Patient not taking: " Reported on 12/18/2024)      [DISCONTINUED] montelukast (SINGULAIR) 10 MG tablet Take 1 tablet by mouth Daily. (Patient not taking: Reported on 12/18/2024)  9    [DISCONTINUED] saccharomyces boulardii (FLORASTOR) 250 MG capsule Take 250 mg by mouth 2 (Two) Times a Day. (Patient not taking: Reported on 12/14/2023)       No current facility-administered medications on file prior to visit.         HISTORY OF PRESENT ILLNESS     Chante presents for annual health maintenance visit.    Well woman exam by Dr. Lissett Pérez August 29, 2024 total woman.  Menopause with hot flashes.  On Paxil 10 mg daily and estradiol patch along with progesterone 100 mg capsule.  Mammogram August 29 benign.  Last Pap smear in 2022.    Colonoscopy 2023 with recall in 5 years with polyps found.    Patient Care Team:  Rolando Ulrich, DNP, APRN as PCP - General (Internal Medicine)  Devaughn Obrien MD as Consulting Physician (Urology)  Orville Cuellar MD as Consulting Physician (Dermatology)  Lissett Pérez MD as Consulting Physician (Obstetrics)    General health: excellent  Lifestyle:  Attempting to lose weight?: No   Diet: eats moderately healthy  Exercise: Has not been as active  Tobacco: Never used   Alcohol: occasional/infrequent  Work: Full-time  Reproductive health:  Sexually active?: Yes   Sexual problems?: No problems  Concern for STD?: No    Sees Gynecologist?: Yes   Corry/Postmenopausal?: Yes   Depression Screening:        PHQ-2 Depression Screening  Little interest or pleasure in doing things? Not at all   Feeling down, depressed, or hopeless? Not at all   PHQ-2 Total Score 0      PHQ-2: 0 (Not depressed)   PHQ-9: 0 (Negative screening for depression)    ALLERGIES  No Known Allergies     PFSH:     The following portions of the patient's history were reviewed and updated as appropriate: Allergies / Current Medications / Past Medical History / Surgical History / Social History / Family History    PROBLEM LIST   Patient  Active Problem List   Diagnosis    Screening for colon cancer    Colon polyps    Internal hemorrhoids    Endometriosis    History of endometrial ablation    Hx of tubal ligation    Menopause       PAST MEDICAL HISTORY  Past Medical History:   Diagnosis Date    Allergic     Asthma     Colon polyp March 2023    Only had 1    Kidney stone     PONV (postoperative nausea and vomiting)        SURGICAL HISTORY  Past Surgical History:   Procedure Laterality Date    BREAST IMPLANT SURGERY  07/10/2020    COLONOSCOPY N/A 2/16/2023    Procedure: COLONOSCOPY TO CECUM AND TI WITH COLD BIOPSY POLYPECTOMY;  Surgeon: Isabel Malcolm MD;  Location: [facility] ENDOSCOPY;  Service: General;  Laterality: N/A;  pre: screening  post: polyp and internal hemorrhoids    SUBTOTAL HYSTERECTOMY N/A 11/2015    TONSILLECTOMY         SOCIAL HISTORY  Social History     Socioeconomic History    Marital status:     Number of children: 0   Tobacco Use    Smoking status: Never    Smokeless tobacco: Never   Vaping Use    Vaping status: Never Used   Substance and Sexual Activity    Alcohol use: Yes     Alcohol/week: 6.0 standard drinks of alcohol     Types: 4 Glasses of wine, 2 Drinks containing 0.5 oz of alcohol per week     Comment: 1 glasses every night; caffeine use    Drug use: No    Sexual activity: Yes     Partners: Male     Birth control/protection: Other       FAMILY HISTORY  Family History   Problem Relation Age of Onset    Hypertension Mother     Hypertension Father     Lung cancer Maternal Grandmother     Prostate cancer Maternal Grandfather     Cancer Maternal Grandfather     Cancer Paternal Grandmother     Heart disease Paternal Grandfather        IMMUNIZATION HISTORY  Immunization History   Administered Date(s) Administered    COVID-19 (PFIZER) 12YRS+ (COMIRNATY) 10/03/2023, 10/08/2024    COVID-19 (PFIZER) BIVALENT 12+YRS 10/03/2022    COVID-19 (PFIZER) Purple Cap Monovalent 03/10/2021, 03/31/2021, 10/18/2021    FluMist 2-49yrs  10/28/2016, 10/30/2017    Flucelvax Quad Vial =>4yrs 09/28/2022    Fluzone  >6mos 10/08/2024    Fluzone (or Fluarix & Flulaval for VFC) >6mos 09/19/2022, 10/03/2023    Fluzone Quad >6mos (Multi-dose) 10/30/2017    Hepatitis A 03/01/2018, 09/01/2018    Influenza, Unspecified 09/28/2018, 09/28/2018    Shingrix 11/04/2021, 01/24/2022    Td, Not Adsorbed 12/12/2022    Tdap 11/01/2012         REVIEW OF SYSTEMS     Review of Systems  Constitutional: Negative for activity change, appetite change, fatigue, fever and unexpected weight change.   HENT: Negative for congestion, ear pain, sore throat and trouble swallowing.    Eyes: Negative for visual disturbance.   Respiratory: Negative for shortness of breath.    Cardiovascular: Negative for chest pain and leg swelling.   Gastrointestinal: Negative for abdominal pain, blood in stool, constipation, diarrhea, nausea and vomiting.   Endocrine: Negative for polydipsia, polyphagia and polyuria.   Genitourinary: Negative for dysuria, pelvic pain, vaginal bleeding and vaginal discharge.   Musculoskeletal: Intermittent back pain   Neurological: Negative for dizziness, weakness, numbness and headaches. Intermittent tingling posterior thigh left   Hematological: Negative for adenopathy.   Psychiatric/Behavioral: Negative for confusion. The patient is not nervous/anxious.       PHYSICAL EXAMINATION     Physical Exam  Constitutional:       Appearance: Normal appearance. She is normal weight.   HENT:      Head: Normocephalic and atraumatic.      Right Ear: Tympanic membrane normal.      Left Ear: Tympanic membrane normal.      Nose: Nose normal. No congestion.      Mouth/Throat:      Mouth: Mucous membranes are moist.      Pharynx: Oropharynx is clear.   Eyes:      Conjunctiva/sclera: Conjunctivae normal.      Pupils: Pupils are equal, round, and reactive to light.   Neck:      Vascular: No carotid bruit.   Cardiovascular:      Rate and Rhythm: Normal rate and regular rhythm.      Pulses:  Normal pulses.      Heart sounds: Normal heart sounds.   Pulmonary:      Effort: Pulmonary effort is normal.      Breath sounds: Normal breath sounds.   Abdominal:      General: There is no distension.      Palpations: Abdomen is soft.      Tenderness: There is no abdominal tenderness. There is no right CVA tenderness, left CVA tenderness or guarding.   Genitourinary:     Comments: Followed by OB/GYN  Musculoskeletal:         General: No swelling, tenderness or signs of injury. Normal range of motion.      Cervical back: Normal range of motion.      Right lower leg: No edema.      Left lower leg: No edema.   Lymphadenopathy:      Cervical: No cervical adenopathy.   Skin:     General: Skin is warm and dry.   Neurological:      Mental Status: She is alert and oriented to person, place, and time.      Cranial Nerves: No cranial nerve deficit.      Motor: No weakness.      Coordination: Coordination normal.      Gait: Gait normal.   Psychiatric:         Mood and Affect: Mood normal.         Behavior: Behavior normal.         Thought Content: Thought content normal.         Judgment: Judgment normal.      REVIEWED DATA      Labs:    Lab Results   Component Value Date     12/12/2024    K 4.4 12/12/2024    CALCIUM 9.3 12/12/2024    AST 18 12/12/2024    ALT 11 12/12/2024    BUN 16 12/12/2024    CREATININE 0.68 12/12/2024    CREATININE 0.65 12/14/2023    CREATININE 0.64 12/06/2022    EGFRIFNONA 95 10/26/2021    EGFRIFAFRI 109 10/26/2021     Lab Results   Component Value Date    GLUCOSE 91 12/12/2024    HGBA1C 5.6 12/12/2024    HGBA1C 5.60 12/14/2023    HGBA1C 5.40 12/06/2022    TSH 2.470 12/12/2024    FREET4 0.99 12/12/2024     Lab Results   Component Value Date     (H) 12/12/2024    HDL 85 12/12/2024    TRIG 65 12/12/2024    CHOLHDLRATIO 3.0 12/12/2024     Lab Results   Component Value Date    EUPF23NW 38.1 12/12/2024      Lab Results   Component Value Date    WBC 5.1 12/12/2024    HGB 14.2 12/12/2024    MCV 95  12/12/2024     12/12/2024     Lab Results   Component Value Date    PROTEIN Trace 12/12/2024    GLUCOSEU Negative 12/12/2024    BLOODU Negative 12/12/2024    NITRITEU Negative 12/12/2024    LEUKOCYTESUR Negative 12/12/2024     Lab Results   Component Value Date    HEPCVIRUSABY <0.1 12/06/2022       Imaging:                Medical Tests:              Summary of old records / correspondence / consultant report:               Request outside records:         ASSESSMENT & PLAN     ANNUAL WELLNESS EXAM / PHYSICAL     Other medical problems addressed today:  Problem List Items Addressed This Visit    None  Visit Diagnoses       Encounter for annual physical exam    -  Primary             Summary/Discussion:     Patient has not been as active this year, extremely stressful year.  Work on diet and exercise.  No changes made    Next Appointment with me: Visit date not found    Return in about 1 year (around 12/18/2025) for Annual physical WITH LABS ONE WEEK PRIOR .      HEALTHCARE MAINTENANCE ISSUES     Cancer Screening:  Colon: Initial/Next screening at age: CURRENT  Repeat colon cancer screening: every 5 years  Breast: Recommended monthly self exams; annual professional exam  Mammogram: every 1 year  Cervical: pelvic exam as recommended by gyne  Skin: Monthly self skin examination, annual exam by health professional  Lung: Does not meet criteria for lung cancer screening.   Other:    Screening Labs & Tests:  Lab results reviewed & discussed with the patient or test orders placed today.  EKG:  CV Screening: Lipid panel  DEXA (65+ or postmenopausal with risk factors):   HEP C (If born [birth year]-08[birth year], or risk factors): Negative screen  Other:     Immunization/Vaccinations (to be given today unless deferred by patient)  Influenza: Patient had the flu shot this season  Hepatitis A: Up to date  Hepatitis B: Verify immunization records  Tetanus/Pertussis: Up to date  Pneumococcal: Not needed at this time  Shingles: Up to  date  COVID: Already had the latest booster   RSV: Not indicated    Lifestyle Counseling:  Lifestyle Modifications: Continue good lifestyle choices/modifications  Safety Issues: Always wear seatbelt, Avoid texting while driving   Use sunscreen, regular skin examination  Recommended annual dental/vision examination.  Emotional/Stress/Sleep: Reviewed and  given when appropriate      Health Maintenance   Topic Date Due    ANNUAL PHYSICAL  12/14/2024    PAP SMEAR  08/01/2025    MAMMOGRAM  08/29/2026    COLORECTAL CANCER SCREENING  02/16/2028    TDAP/TD VACCINES (4 - Td or Tdap) 12/12/2032    HEPATITIS C SCREENING  Completed    COVID-19 Vaccine  Completed    INFLUENZA VACCINE  Completed    ZOSTER VACCINE  Completed    Pneumococcal Vaccine 0-64  Aged Out

## (undated) DEVICE — CANN O2 ETCO2 FITS ALL CONN CO2 SMPL A/ 7IN DISP LF

## (undated) DEVICE — SINGLE-USE BIOPSY FORCEPS: Brand: RADIAL JAW 4

## (undated) DEVICE — ADAPT CLN BIOGUARD AIR/H2O DISP

## (undated) DEVICE — TUBING, SUCTION, 1/4" X 10', STRAIGHT: Brand: MEDLINE

## (undated) DEVICE — SENSR O2 OXIMAX FNGR A/ 18IN NONSTR

## (undated) DEVICE — KT ORCA ORCAPOD DISP STRL

## (undated) DEVICE — GOWN,SIRUS,NON REINFRCD,LARGE,SET IN SL: Brand: MEDLINE